# Patient Record
Sex: MALE | Race: WHITE | NOT HISPANIC OR LATINO | URBAN - METROPOLITAN AREA
[De-identification: names, ages, dates, MRNs, and addresses within clinical notes are randomized per-mention and may not be internally consistent; named-entity substitution may affect disease eponyms.]

---

## 2018-06-25 ENCOUNTER — OFFICE VISIT (OUTPATIENT)
Dept: SURGERY | Facility: HOSPITAL | Age: 63
End: 2018-06-25
Payer: MEDICARE

## 2018-06-25 VITALS
RESPIRATION RATE: 16 BRPM | DIASTOLIC BLOOD PRESSURE: 78 MMHG | TEMPERATURE: 97.1 F | SYSTOLIC BLOOD PRESSURE: 120 MMHG | BODY MASS INDEX: 26.18 KG/M2 | HEIGHT: 71 IN | WEIGHT: 187 LBS | HEART RATE: 72 BPM

## 2018-06-25 DIAGNOSIS — D22.9 MULTIPLE PIGMENTED NEVI: ICD-10-CM

## 2018-06-25 DIAGNOSIS — M62.08 RECTUS DIASTASIS: ICD-10-CM

## 2018-06-25 DIAGNOSIS — K40.20 BILATERAL INGUINAL HERNIA WITHOUT OBSTRUCTION OR GANGRENE, RECURRENCE NOT SPECIFIED: ICD-10-CM

## 2018-06-25 DIAGNOSIS — R10.9 ABDOMINAL PAIN, UNSPECIFIED ABDOMINAL LOCATION: Primary | ICD-10-CM

## 2018-06-25 DIAGNOSIS — K42.9 UMBILICAL HERNIA WITHOUT OBSTRUCTION AND WITHOUT GANGRENE: ICD-10-CM

## 2018-06-25 PROCEDURE — 99204 OFFICE O/P NEW MOD 45 MIN: CPT | Performed by: SURGERY

## 2018-06-25 RX ORDER — CITALOPRAM 20 MG/1
20 TABLET ORAL DAILY
Refills: 6 | Status: ON HOLD | COMMUNITY
Start: 2018-04-23 | End: 2018-11-27 | Stop reason: ALTCHOICE

## 2018-06-25 RX ORDER — CLOPIDOGREL BISULFATE 75 MG/1
75 TABLET ORAL DAILY
COMMUNITY
End: 2018-11-27 | Stop reason: HOSPADM

## 2018-06-25 RX ORDER — TRAZODONE HYDROCHLORIDE 100 MG/1
50 TABLET ORAL
Refills: 6 | COMMUNITY
Start: 2018-05-20 | End: 2022-04-15

## 2018-06-25 RX ORDER — ALFUZOSIN HYDROCHLORIDE 10 MG/1
10 TABLET, EXTENDED RELEASE ORAL DAILY
COMMUNITY
Start: 2018-06-24

## 2018-06-25 NOTE — PROGRESS NOTES
Assessment/Plan:  Adan Hughes is a 70 y o  male who presents today, per referral by Dr Madisyn Kimble, in consultation regarding possible bilateral inguinal and umbilical hernias  Physical exam confirmed the presence of moderately sized reducible bilateral inguinal hernias and a small reducible umbilical hernia  His bilateral upper quadrant pain may be referred from his bilateral inguinal hernias  However, a CTAP will be done to rule out other intraabdominal causes of his bilateral upper quadrant pain  Discussed risks, benefits, and alternatives of laparoscopic bilateral inguinal hernia repair with mesh and open umbilical hernia repair along with pre- and post- operative protocol  Explained that lifting restrictions of nothing greater than 15 lbs will be in place for weeks 1-2 and these will progress to 25 lbs during weeks 3-4  Light cardio may be resumed in weeks 3-4  He would like to plan for hernia repair after windsurfing season  He will have a CTAP with contrast done then return to discuss the results and plan for surgery in Autumn  Otherwise, he knows to contact our office if any problems or concerns arise  Skin- Encouraged him to have an annual skin exam performed by his PCP for monitoring of his multiple pigmented nevi  Rectus diastasis- Explained that this is a separation of abdominal wall muscles which increases his risk for ventral hernia formation along the midline  There are no diagnoses linked to this encounter  Subjective:      Patient ID: Adan Hughes is a 70 y o  male who presents today, per referral by Dr Madisyn Kimble, in consultation regarding possible bilateral inguinal and umbilical hernias  He has been windsurfing for many years  A few years ago, he felt onset of bilateral upper quadrant pain associated with a popping sensation while lifting his sail   Since then he has occasional severe bilateral upper quadrant and groin pain which is relieved by reduction of abdominal lumps and rest  Denies any nausea, vomiting, fever, and chills  Normal bowel and bladder habits  He currently takes Plavix for blood thinning  He is up to date on his colonoscopy  Recently, he has established with a urologist after being noted to have an elevated PSA  No past history of abdominal surgery  The following portions of the patient's history were reviewed and updated as appropriate: allergies, current medications, past family history, past medical history, past social history, past surgical history and problem list     Review of Systems   Constitutional: Negative  HENT: Negative  Eyes: Negative  Respiratory: Negative  Cardiovascular: Negative  Gastrointestinal: Positive for abdominal pain  Negative for constipation, diarrhea, nausea and vomiting  Endocrine: Negative  Genitourinary: Negative  Musculoskeletal: Positive for myalgias  Negative for arthralgias, back pain, gait problem, joint swelling, neck pain and neck stiffness  Skin: Negative  Allergic/Immunologic: Negative  Neurological: Negative  Hematological: Negative  Psychiatric/Behavioral: Negative  All other systems reviewed and are negative  Objective:      /78 (BP Location: Left arm, Patient Position: Sitting, Cuff Size: Standard)   Pulse 72   Temp (!) 97 1 °F (36 2 °C) (Tympanic)   Resp 16   Ht 5' 11" (1 803 m)   Wt 84 8 kg (187 lb)   BMI 26 08 kg/m²          Physical Exam   Constitutional: He is oriented to person, place, and time  He appears well-developed and well-nourished  No distress  HENT:   Head: Normocephalic and atraumatic  Right Ear: External ear normal    Left Ear: External ear normal    Nose: Nose normal    Mouth/Throat: Oropharynx is clear and moist  No oropharyngeal exudate  Eyes: Conjunctivae and EOM are normal  Right eye exhibits no discharge  Left eye exhibits no discharge  No scleral icterus  Neck: Normal range of motion  Neck supple   No JVD present  No tracheal deviation present  No thyromegaly present  Cardiovascular: Normal rate, regular rhythm, normal heart sounds and intact distal pulses  Exam reveals no gallop and no friction rub  No murmur heard  Pulmonary/Chest: Effort normal and breath sounds normal  No stridor  No respiratory distress  He has no wheezes  He has no rales  He exhibits no tenderness  Abdominal: Soft  Bowel sounds are normal  He exhibits no distension and no mass  There is no tenderness  There is no rebound and no guarding  Moderately sized reducible bilateral inguinal hernias and a small reducible umbilical hernia  Small rectus diastasis  Musculoskeletal: Normal range of motion  He exhibits no edema, tenderness or deformity  Lymphadenopathy:     He has no cervical adenopathy  Neurological: He is alert and oriented to person, place, and time  No cranial nerve deficit  Coordination normal    Skin: Skin is dry  No rash noted  He is not diaphoretic  No erythema  No pallor  Pigmented nevi and small hemangioma of the back  Psychiatric: He has a normal mood and affect  His behavior is normal  Thought content normal    Nursing note and vitals reviewed  By signing my name below, Olimpia Rubalcava, attest that this documentation has been prepared under the direction and in the presence of Sunday Nicole MD  Electonically Signed: James Rosalie, [address] [place] [address] 6/25/2018      I, Sunday Nicole, personally performed the services described in this documenation  All medical record entries made by the scribe were at my direction and in my presence  I have reviewed the chart and discharge instructions (if applicable) and agree that the record reflects my personal performance and is accurate and complete  Sunday Nicole MD  6/25/2018

## 2018-06-25 NOTE — LETTER
June 27, 2018     Franklin Garcia DO  [address] [facility] [address]  [place] [id number]    Patient: Jaleesa Fontana   YOB: 1955   Date of Visit: 6/25/2018       Dear Dr Lc Mckee: Thank you for referring Francisca Hoffmann to me for evaluation  Below are my notes for this consultation  If you have questions, please do not hesitate to call me  I look forward to following your patient along with you           Sincerely,        Dior Knott MD        CC: No Recipients

## 2018-06-26 ENCOUNTER — APPOINTMENT (OUTPATIENT)
Dept: LAB | Facility: HOSPITAL | Age: 63
End: 2018-06-26
Attending: SURGERY
Payer: MEDICARE

## 2018-06-26 DIAGNOSIS — R10.9 ABDOMINAL PAIN, UNSPECIFIED ABDOMINAL LOCATION: ICD-10-CM

## 2018-06-26 LAB
ANION GAP SERPL CALCULATED.3IONS-SCNC: 7 MMOL/L (ref 4–13)
BUN SERPL-MCNC: 14 MG/DL (ref 5–25)
CALCIUM SERPL-MCNC: 8.6 MG/DL (ref 8.3–10.1)
CHLORIDE SERPL-SCNC: 107 MMOL/L (ref 100–108)
CO2 SERPL-SCNC: 26 MMOL/L (ref 21–32)
CREAT SERPL-MCNC: 0.93 MG/DL (ref 0.6–1.3)
GFR SERPL CREATININE-BSD FRML MDRD: 86 ML/MIN/1.73SQ M
GLUCOSE P FAST SERPL-MCNC: 100 MG/DL (ref 65–99)
POTASSIUM SERPL-SCNC: 3.8 MMOL/L (ref 3.5–5.3)
SODIUM SERPL-SCNC: 140 MMOL/L (ref 136–145)

## 2018-06-26 PROCEDURE — 36415 COLL VENOUS BLD VENIPUNCTURE: CPT

## 2018-06-26 PROCEDURE — 80048 BASIC METABOLIC PNL TOTAL CA: CPT

## 2018-06-28 ENCOUNTER — HOSPITAL ENCOUNTER (OUTPATIENT)
Dept: CT IMAGING | Facility: HOSPITAL | Age: 63
Discharge: HOME | End: 2018-06-28
Attending: SURGERY
Payer: MEDICARE

## 2018-06-28 DIAGNOSIS — R10.9 ABDOMINAL PAIN, UNSPECIFIED ABDOMINAL LOCATION: ICD-10-CM

## 2018-06-28 PROCEDURE — 74177 CT ABD & PELVIS W/CONTRAST: CPT

## 2018-06-28 RX ADMIN — IOHEXOL 100 ML: 350 INJECTION, SOLUTION INTRAVENOUS at 08:43

## 2018-11-05 ENCOUNTER — APPOINTMENT (OUTPATIENT)
Dept: LAB | Facility: HOSPITAL | Age: 63
End: 2018-11-05
Attending: SURGERY
Payer: MEDICARE

## 2018-11-05 ENCOUNTER — HOSPITAL ENCOUNTER (OUTPATIENT)
Dept: RADIOLOGY | Facility: HOSPITAL | Age: 63
Discharge: HOME | End: 2018-11-05
Attending: SURGERY
Payer: MEDICARE

## 2018-11-05 ENCOUNTER — OFFICE VISIT (OUTPATIENT)
Dept: SURGERY | Facility: HOSPITAL | Age: 63
End: 2018-11-05
Payer: MEDICARE

## 2018-11-05 ENCOUNTER — HOSPITAL ENCOUNTER (OUTPATIENT)
Dept: NON INVASIVE DIAGNOSTICS | Facility: HOSPITAL | Age: 63
Discharge: HOME | End: 2018-11-05
Attending: SURGERY
Payer: MEDICARE

## 2018-11-05 VITALS
DIASTOLIC BLOOD PRESSURE: 80 MMHG | TEMPERATURE: 98.2 F | HEART RATE: 60 BPM | SYSTOLIC BLOOD PRESSURE: 124 MMHG | HEIGHT: 71 IN | WEIGHT: 188 LBS | BODY MASS INDEX: 26.32 KG/M2

## 2018-11-05 DIAGNOSIS — K40.20 NON-RECURRENT BILATERAL INGUINAL HERNIA WITHOUT OBSTRUCTION OR GANGRENE: ICD-10-CM

## 2018-11-05 DIAGNOSIS — K40.20 NON-RECURRENT BILATERAL INGUINAL HERNIA WITHOUT OBSTRUCTION OR GANGRENE: Primary | ICD-10-CM

## 2018-11-05 DIAGNOSIS — K42.9 UMBILICAL HERNIA WITHOUT OBSTRUCTION AND WITHOUT GANGRENE: ICD-10-CM

## 2018-11-05 DIAGNOSIS — K40.20 BILATERAL INGUINAL HERNIA WITHOUT OBSTRUCTION OR GANGRENE, RECURRENCE NOT SPECIFIED: ICD-10-CM

## 2018-11-05 DIAGNOSIS — K42.9 UMBILICAL HERNIA WITHOUT OBSTRUCTION OR GANGRENE: ICD-10-CM

## 2018-11-05 LAB
ANION GAP SERPL CALCULATED.3IONS-SCNC: 8 MMOL/L (ref 4–13)
BASOPHILS # BLD AUTO: 0.04 THOUSANDS/ΜL (ref 0–0.1)
BASOPHILS NFR BLD AUTO: 1 % (ref 0–1)
BILIRUB UR QL STRIP: NEGATIVE
BUN SERPL-MCNC: 17 MG/DL (ref 5–25)
CALCIUM SERPL-MCNC: 9.3 MG/DL (ref 8.3–10.1)
CHLORIDE SERPL-SCNC: 105 MMOL/L (ref 100–108)
CLARITY UR: CLEAR
CO2 SERPL-SCNC: 29 MMOL/L (ref 21–32)
COLOR UR: YELLOW
CREAT SERPL-MCNC: 0.9 MG/DL (ref 0.6–1.3)
EOSINOPHIL # BLD AUTO: 0.35 THOUSAND/ΜL (ref 0–0.61)
EOSINOPHIL NFR BLD AUTO: 6 % (ref 0–6)
ERYTHROCYTE [DISTWIDTH] IN BLOOD BY AUTOMATED COUNT: 12.8 % (ref 11.6–15.1)
EST. AVERAGE GLUCOSE BLD GHB EST-MCNC: 120 MG/DL
GFR SERPL CREATININE-BSD FRML MDRD: 89 ML/MIN/1.73SQ M
GLUCOSE SERPL-MCNC: 105 MG/DL (ref 65–140)
GLUCOSE UR STRIP-MCNC: NEGATIVE MG/DL
HBA1C MFR BLD: 5.8 % (ref 4.2–6.3)
HCT VFR BLD AUTO: 44 % (ref 36.5–49.3)
HGB BLD-MCNC: 14.4 G/DL (ref 12–17)
HGB UR QL STRIP.AUTO: NEGATIVE
IMM GRANULOCYTES # BLD AUTO: 0.03 THOUSAND/UL (ref 0–0.2)
IMM GRANULOCYTES NFR BLD AUTO: 1 % (ref 0–2)
KETONES UR STRIP-MCNC: NEGATIVE MG/DL
LEUKOCYTE ESTERASE UR QL STRIP: NEGATIVE
LYMPHOCYTES # BLD AUTO: 1.62 THOUSANDS/ΜL (ref 0.6–4.47)
LYMPHOCYTES NFR BLD AUTO: 28 % (ref 14–44)
MCH RBC QN AUTO: 31.2 PG (ref 26.8–34.3)
MCHC RBC AUTO-ENTMCNC: 32.7 G/DL (ref 31.4–37.4)
MCV RBC AUTO: 95 FL (ref 82–98)
MONOCYTES # BLD AUTO: 0.61 THOUSAND/ΜL (ref 0.17–1.22)
MONOCYTES NFR BLD AUTO: 11 % (ref 4–12)
NEUTROPHILS # BLD AUTO: 3.05 THOUSANDS/ΜL (ref 1.85–7.62)
NEUTS SEG NFR BLD AUTO: 53 % (ref 43–75)
NITRITE UR QL STRIP: NEGATIVE
NRBC BLD AUTO-RTO: 0 /100 WBCS
PH UR STRIP.AUTO: 7 [PH] (ref 4.5–8)
PLATELET # BLD AUTO: 212 THOUSANDS/UL (ref 149–390)
PMV BLD AUTO: 10.1 FL (ref 8.9–12.7)
POTASSIUM SERPL-SCNC: 3.8 MMOL/L (ref 3.5–5.3)
PROT UR STRIP-MCNC: NEGATIVE MG/DL
RBC # BLD AUTO: 4.61 MILLION/UL (ref 3.88–5.62)
SODIUM SERPL-SCNC: 142 MMOL/L (ref 136–145)
SP GR UR STRIP.AUTO: 1.02 (ref 1–1.03)
UROBILINOGEN UR QL STRIP.AUTO: 0.2 E.U./DL
WBC # BLD AUTO: 5.7 THOUSAND/UL (ref 4.31–10.16)

## 2018-11-05 PROCEDURE — 1124F ACP DISCUSS-NO DSCNMKR DOCD: CPT | Performed by: PHYSICIAN ASSISTANT

## 2018-11-05 PROCEDURE — 36415 COLL VENOUS BLD VENIPUNCTURE: CPT

## 2018-11-05 PROCEDURE — 80048 BASIC METABOLIC PNL TOTAL CA: CPT

## 2018-11-05 PROCEDURE — 83036 HEMOGLOBIN GLYCOSYLATED A1C: CPT

## 2018-11-05 PROCEDURE — 71046 X-RAY EXAM CHEST 2 VIEWS: CPT

## 2018-11-05 PROCEDURE — 99213 OFFICE O/P EST LOW 20 MIN: CPT | Performed by: SURGERY

## 2018-11-05 PROCEDURE — 81003 URINALYSIS AUTO W/O SCOPE: CPT | Performed by: SURGERY

## 2018-11-05 PROCEDURE — 93005 ELECTROCARDIOGRAM TRACING: CPT

## 2018-11-05 PROCEDURE — 85025 COMPLETE CBC W/AUTO DIFF WBC: CPT

## 2018-11-05 RX ORDER — ASPIRIN 81 MG/1
81 TABLET ORAL DAILY
COMMUNITY
End: 2018-11-27 | Stop reason: HOSPADM

## 2018-11-05 NOTE — PROGRESS NOTES
Assessment/Plan: Danilo Cade is a 70year old male who presents today for follow-up regarding bilateral inguinal hernias and an umbilical hernia  Physical exam revealed a small umbilical hernia and bilateral inguinal hernias  The left inguinal hernia is larger than the right side  Discussed risks, benefits, and alternatives to laparoscopic repair of hernias  Explained the surgery as well as pre- and post-procedural protocols and restrictions  Lifting restrictions of no greater than 15 pounds and no strenuous activity for 2 weeks after surgery  No heavy lifting of greater than 25 pounds for weeks 3 and 4  He should not drive until he is off narcotics  He will need to hold his Plavix and Aspirin for a week prior to surgery  Will coordinate clearance with his cardiologist  The office will schedule him for the procedure  He knows to contact the office if any questions or concerns arise  No problem-specific Assessment & Plan notes found for this encounter  There are no diagnoses linked to this encounter  Subjective:      Patient ID: Fransico Downs is a 70 y o  male  Danilo Cade is a 70year old male who presents today for follow-up regarding bilateral inguinal hernias and an umbilical hernia  He says he has had occasional episodes of pain in his abdomen when he is bending down  He is currently taking Plavix and has spoken with his cardiologist about having the procedure  The following portions of the patient's history were reviewed and updated as appropriate: allergies, current medications, past family history, past medical history, past social history, past surgical history and problem list     Review of Systems   Constitutional: Negative  HENT: Negative  Eyes: Negative  Respiratory: Negative  Cardiovascular: Negative  Gastrointestinal: Positive for abdominal pain  Endocrine: Negative  Genitourinary: Negative  Musculoskeletal: Negative  Skin: Negative  Allergic/Immunologic: Negative  Neurological: Negative  Hematological: Negative  Psychiatric/Behavioral: Negative  All other systems reviewed and are negative  Objective:      /80   Pulse 60   Temp 98 2 °F (36 8 °C) (Tympanic)   Ht 5' 11" (1 803 m)   Wt 85 3 kg (188 lb)   BMI 26 22 kg/m²          Physical Exam   Constitutional: He is oriented to person, place, and time  He appears well-developed and well-nourished  No distress  HENT:   Head: Normocephalic and atraumatic  Right Ear: External ear normal    Left Ear: External ear normal    Nose: Nose normal    Mouth/Throat: Oropharynx is clear and moist  No oropharyngeal exudate  Eyes: Conjunctivae and EOM are normal  Right eye exhibits no discharge  Left eye exhibits no discharge  No scleral icterus  Neck: Normal range of motion  Neck supple  No JVD present  No tracheal deviation present  No thyromegaly present  Cardiovascular: Normal rate, regular rhythm, normal heart sounds and intact distal pulses  Exam reveals no gallop and no friction rub  No murmur heard  Pulmonary/Chest: Effort normal and breath sounds normal  No stridor  No respiratory distress  He has no wheezes  He has no rales  He exhibits no tenderness  Abdominal: Soft  Bowel sounds are normal  He exhibits no distension and no mass  There is no tenderness  There is no rebound and no guarding  Small umbilical hernia and bilateral inguinal hernias  The left inguinal hernia is larger than the right side  Musculoskeletal: Normal range of motion  He exhibits no edema, tenderness or deformity  Lymphadenopathy:     He has no cervical adenopathy  Neurological: He is alert and oriented to person, place, and time  No cranial nerve deficit  Coordination normal    Skin: Skin is dry  No rash noted  He is not diaphoretic  No erythema  No pallor  Psychiatric: He has a normal mood and affect   His behavior is normal  Thought content normal    Nursing note and vitals reviewed  By signing my name below, I, Cruzito Tillman, attest that this documentation has been prepared under the direction and in the presence of Herminio Yung MD  Electronically Signed: Yeyo Rico  11/5/18  Lynnette Miranda, personally performed the services described in this documentation  All medical record entries made by the scribe were at my direction and in my presence  I have reviewed the chart and discharge instructions and agree that the record reflects my personal performance and is accurate and complete  Herminio Yung MD  11/5/18

## 2018-11-05 NOTE — LETTER
November 8, 2018     [place] [address]    Patient: Guzman Ellis   YOB: 1955   Date of Visit: 11/5/2018       Dear Dr Sara Recio: Thank you for referring Sesar Mondragon to me for evaluation  Below are my notes for this consultation  If you have questions, please do not hesitate to call me  I look forward to following your patient along with you  Sincerely,        Shiloh Duff MD        CC: No Recipients  Samantha Marshall  11/5/2018  9:29 AM  Sign at close encounter  Assessment/Plan: Sesar Mondragon is a 70year old male who presents today for follow-up regarding bilateral inguinal hernias and an umbilical hernia  No problem-specific Assessment & Plan notes found for this encounter  There are no diagnoses linked to this encounter  Subjective:      Patient ID: Guzman Ellis is a 70 y o  male  Sesar Mondragon is a 70year old male who presents today for follow-up regarding bilateral inguinal hernias and an umbilical hernia  The following portions of the patient's history were reviewed and updated as appropriate: allergies, current medications, past family history, past medical history, past social history, past surgical history and problem list     Review of Systems      Objective:      /80   Pulse 60   Temp 98 2 °F (36 8 °C) (Tympanic)   Ht 5' 11" (1 803 m)   Wt 85 3 kg (188 lb)   BMI 26 22 kg/m²           Physical Exam      By signing my name below, I, Samantha Marshall, attest that this documentation has been prepared under the direction and in the presence of Shiloh Duff MD  Electronically Signed: Dimitry Tan  11/5/18  Kirsten Cheney, personally performed the services described in this documentation  All medical record entries made by the dimitry were at my direction and in my presence   I have reviewed the chart and discharge instructions and agree that the record reflects my personal performance and is accurate and complete  Danae Dailey MD  11/5/18

## 2018-11-06 LAB
ATRIAL RATE: 51 BPM
P AXIS: 50 DEGREES
PR INTERVAL: 140 MS
QRS AXIS: 1 DEGREES
QRSD INTERVAL: 94 MS
QT INTERVAL: 450 MS
QTC INTERVAL: 414 MS
T WAVE AXIS: 33 DEGREES
VENTRICULAR RATE: 51 BPM

## 2018-11-06 PROCEDURE — 93010 ELECTROCARDIOGRAM REPORT: CPT | Performed by: INTERNAL MEDICINE

## 2018-11-08 RX ORDER — SODIUM CHLORIDE, SODIUM LACTATE, POTASSIUM CHLORIDE, CALCIUM CHLORIDE 600; 310; 30; 20 MG/100ML; MG/100ML; MG/100ML; MG/100ML
125 INJECTION, SOLUTION INTRAVENOUS CONTINUOUS
Status: CANCELLED | OUTPATIENT
Start: 2018-11-27

## 2018-11-14 RX ORDER — HYDROCHLOROTHIAZIDE 12.5 MG/1
12.5 TABLET ORAL DAILY
COMMUNITY

## 2018-11-14 RX ORDER — ENALAPRIL MALEATE 20 MG/1
20 TABLET ORAL DAILY
COMMUNITY

## 2018-11-14 NOTE — PRE-PROCEDURE INSTRUCTIONS
Pre-Surgery Instructions:   Medication Instructions    alfuzosin (UROXATRAL) 10 mg 24 hr tablet Instructed patient per Anesthesia Guidelines   aspirin (ECOTRIN LOW STRENGTH) 81 mg EC tablet Patient was instructed by Physician and understands   atorvastatin (LIPITOR) 40 mg tablet Instructed patient per Anesthesia Guidelines   citalopram (CeleXA) 20 mg tablet Instructed patient per Anesthesia Guidelines   clopidogrel (PLAVIX) 75 mg tablet Patient was instructed by Physician and understands   enalapril (VASOTEC) 20 mg tablet Instructed patient per Anesthesia Guidelines   escitalopram (LEXAPRO) 20 mg tablet Instructed patient per Anesthesia Guidelines   traZODone (DESYREL) 100 mg tablet Instructed patient per Anesthesia Guidelines   verapamil (CALAN) 120 mg tablet Instructed patient per Anesthesia Guidelines  Pre-op Showering Instructions for Surgery Patients    Before your operation, you play an important role in decreasing your risk for infection by washing with special antiseptic soap  This is an effective way to reduce bacteria on the skin which may help to prevent infections at the surgical site  Please read the following directions in advance  1  In the week before your operation, purchase a 4 ounce bottle of antiseptic soap containing chlorhexidine gluconate (CHG)  4%  Some brand names include: Aplicare®, Endure, and Hibiclens®  The cost is usually less than $5 00   For your convenience, the [employer] carries the soap   It may also be available at your doctors office or pre-admission testing center, and at most retail pharmacies   If you are allergic or sensitive to soaps containing CHG, please let your doctor know so another antiseptic can be suggested   CHG antiseptic soap is for external use only  2   The day before your operation, follow these instructions carefully to get ready   Please clean linens (sheets) on your bed; you should sleep on clean sheets after your evening shower   Get clean towels and washcloth ready - you need enough for 2 showers   Set aside clean underwear, pajamas, and clothing to wear after the showers     Reminders:   DO NOT use any other soap or body rinse on your skin during or after the antiseptic showers   DO NOT use lotion, powder, deodorant, or perfume/aftershave of any kind on your skin after your antiseptic shower   DO NOT shave any body parts in the 24 hours/day before your operation   DO NOT get the antiseptic soap in your eyes, ears, nose, mouth, or vaginal area    3  You will need to shower the night before AND the morning of your surgery  Shower 1:   The first evening before the operation, take the first shower   First, shampoo your hair with regular shampoo and rinse it completely before you use the antiseptic soap  After washing and rinsing your hair, rinse your body   Next, use a clean washcloth to apply the antiseptic soap and wash your body from the neck down to your toes using ½ bottle of the antiseptic soap   You will use the other ½ bottle for the second shower   Clean the area where your incision will be; lather this area well for about 2 minutes   If you are having head or neck surgery, wash areas with the antiseptic soap   Rinse yourself completely with running water   Use a clean towel to dry off   Wear clean underwear and clothing/pajamas  Shower 2   The morning of your operation, take the second shower following the same steps as Shower 1 using the second ½ of the bottle of antiseptic soap   Use clean cloths and towels to wash and dry yourself   Wear clean underwear and clothing

## 2018-11-27 ENCOUNTER — ANESTHESIA (OUTPATIENT)
Dept: PERIOP | Facility: HOSPITAL | Age: 63
End: 2018-11-27
Payer: MEDICARE

## 2018-11-27 ENCOUNTER — HOSPITAL ENCOUNTER (OUTPATIENT)
Facility: HOSPITAL | Age: 63
Setting detail: OUTPATIENT SURGERY
Discharge: HOME | End: 2018-11-27
Attending: SURGERY | Admitting: SURGERY
Payer: MEDICARE

## 2018-11-27 ENCOUNTER — ANESTHESIA EVENT (OUTPATIENT)
Dept: PERIOP | Facility: HOSPITAL | Age: 63
End: 2018-11-27
Payer: MEDICARE

## 2018-11-27 VITALS
BODY MASS INDEX: 26.07 KG/M2 | SYSTOLIC BLOOD PRESSURE: 154 MMHG | HEIGHT: 71 IN | WEIGHT: 186.2 LBS | RESPIRATION RATE: 18 BRPM | HEART RATE: 84 BPM | OXYGEN SATURATION: 95 % | TEMPERATURE: 97.5 F | DIASTOLIC BLOOD PRESSURE: 80 MMHG

## 2018-11-27 DIAGNOSIS — K40.20 NON-RECURRENT BILATERAL INGUINAL HERNIA WITHOUT OBSTRUCTION OR GANGRENE: Primary | ICD-10-CM

## 2018-11-27 DIAGNOSIS — R33.9 URINARY RETENTION: ICD-10-CM

## 2018-11-27 DIAGNOSIS — K42.9 UMBILICAL HERNIA WITHOUT OBSTRUCTION OR GANGRENE: ICD-10-CM

## 2018-11-27 PROCEDURE — C1781 MESH (IMPLANTABLE): HCPCS | Performed by: SURGERY

## 2018-11-27 PROCEDURE — 51798 US URINE CAPACITY MEASURE: CPT | Performed by: SURGERY

## 2018-11-27 PROCEDURE — 49650 LAP ING HERNIA REPAIR INIT: CPT | Performed by: PHYSICIAN ASSISTANT

## 2018-11-27 DEVICE — LAPAROSCOPIC SELF-FIXATING MESH, LEFT AND RIGHT ANATOMICAL
Type: IMPLANTABLE DEVICE | Site: INGUINAL | Status: FUNCTIONAL
Brand: PROGRIP

## 2018-11-27 RX ORDER — ACETAMINOPHEN 325 MG/1
650 TABLET ORAL EVERY 6 HOURS PRN
Status: DISCONTINUED | OUTPATIENT
Start: 2018-11-27 | End: 2018-11-27 | Stop reason: HOSPADM

## 2018-11-27 RX ORDER — EPHEDRINE SULFATE 50 MG/ML
INJECTION, SOLUTION INTRAVENOUS AS NEEDED
Status: DISCONTINUED | OUTPATIENT
Start: 2018-11-27 | End: 2018-11-27 | Stop reason: SURG

## 2018-11-27 RX ORDER — ONDANSETRON 2 MG/ML
INJECTION INTRAMUSCULAR; INTRAVENOUS AS NEEDED
Status: DISCONTINUED | OUTPATIENT
Start: 2018-11-27 | End: 2018-11-27 | Stop reason: SURG

## 2018-11-27 RX ORDER — CEFAZOLIN SODIUM 2 G/50ML
2000 SOLUTION INTRAVENOUS ONCE
Status: COMPLETED | OUTPATIENT
Start: 2018-11-27 | End: 2018-11-27

## 2018-11-27 RX ORDER — HYDROMORPHONE HCL/PF 1 MG/ML
0.5 SYRINGE (ML) INJECTION
Status: DISCONTINUED | OUTPATIENT
Start: 2018-11-27 | End: 2018-11-27 | Stop reason: HOSPADM

## 2018-11-27 RX ORDER — ONDANSETRON 2 MG/ML
4 INJECTION INTRAMUSCULAR; INTRAVENOUS EVERY 6 HOURS PRN
Status: DISCONTINUED | OUTPATIENT
Start: 2018-11-27 | End: 2018-11-27 | Stop reason: HOSPADM

## 2018-11-27 RX ORDER — PROPOFOL 10 MG/ML
INJECTION, EMULSION INTRAVENOUS AS NEEDED
Status: DISCONTINUED | OUTPATIENT
Start: 2018-11-27 | End: 2018-11-27 | Stop reason: SURG

## 2018-11-27 RX ORDER — ROCURONIUM BROMIDE 10 MG/ML
INJECTION, SOLUTION INTRAVENOUS AS NEEDED
Status: DISCONTINUED | OUTPATIENT
Start: 2018-11-27 | End: 2018-11-27 | Stop reason: SURG

## 2018-11-27 RX ORDER — FENTANYL CITRATE 50 UG/ML
INJECTION, SOLUTION INTRAMUSCULAR; INTRAVENOUS AS NEEDED
Status: DISCONTINUED | OUTPATIENT
Start: 2018-11-27 | End: 2018-11-27 | Stop reason: SURG

## 2018-11-27 RX ORDER — GLYCOPYRROLATE 0.2 MG/ML
INJECTION INTRAMUSCULAR; INTRAVENOUS AS NEEDED
Status: DISCONTINUED | OUTPATIENT
Start: 2018-11-27 | End: 2018-11-27 | Stop reason: SURG

## 2018-11-27 RX ORDER — FENTANYL CITRATE/PF 50 MCG/ML
25 SYRINGE (ML) INJECTION
Status: DISCONTINUED | OUTPATIENT
Start: 2018-11-27 | End: 2018-11-27 | Stop reason: HOSPADM

## 2018-11-27 RX ORDER — SODIUM CHLORIDE, SODIUM LACTATE, POTASSIUM CHLORIDE, CALCIUM CHLORIDE 600; 310; 30; 20 MG/100ML; MG/100ML; MG/100ML; MG/100ML
125 INJECTION, SOLUTION INTRAVENOUS CONTINUOUS
Status: DISCONTINUED | OUTPATIENT
Start: 2018-11-27 | End: 2018-11-27 | Stop reason: HOSPADM

## 2018-11-27 RX ORDER — ONDANSETRON 2 MG/ML
4 INJECTION INTRAMUSCULAR; INTRAVENOUS ONCE AS NEEDED
Status: DISCONTINUED | OUTPATIENT
Start: 2018-11-27 | End: 2018-11-27 | Stop reason: HOSPADM

## 2018-11-27 RX ORDER — HYDROCODONE BITARTRATE AND ACETAMINOPHEN 5; 325 MG/1; MG/1
1 TABLET ORAL EVERY 4 HOURS PRN
Qty: 18 TABLET | Refills: 0 | Status: SHIPPED | OUTPATIENT
Start: 2018-11-27 | End: 2018-12-07

## 2018-11-27 RX ORDER — MAGNESIUM HYDROXIDE 1200 MG/15ML
LIQUID ORAL AS NEEDED
Status: DISCONTINUED | OUTPATIENT
Start: 2018-11-27 | End: 2018-11-27 | Stop reason: HOSPADM

## 2018-11-27 RX ORDER — HYDROCODONE BITARTRATE AND ACETAMINOPHEN 5; 325 MG/1; MG/1
2 TABLET ORAL EVERY 4 HOURS PRN
Status: DISCONTINUED | OUTPATIENT
Start: 2018-11-27 | End: 2018-11-27 | Stop reason: HOSPADM

## 2018-11-27 RX ORDER — MIDAZOLAM HYDROCHLORIDE 1 MG/ML
INJECTION INTRAMUSCULAR; INTRAVENOUS AS NEEDED
Status: DISCONTINUED | OUTPATIENT
Start: 2018-11-27 | End: 2018-11-27 | Stop reason: SURG

## 2018-11-27 RX ORDER — KETOROLAC TROMETHAMINE 30 MG/ML
INJECTION, SOLUTION INTRAMUSCULAR; INTRAVENOUS AS NEEDED
Status: DISCONTINUED | OUTPATIENT
Start: 2018-11-27 | End: 2018-11-27 | Stop reason: SURG

## 2018-11-27 RX ADMIN — ROCURONIUM BROMIDE 50 MG: 10 SOLUTION INTRAVENOUS at 08:30

## 2018-11-27 RX ADMIN — FENTANYL CITRATE 50 MCG: 50 INJECTION, SOLUTION INTRAMUSCULAR; INTRAVENOUS at 08:47

## 2018-11-27 RX ADMIN — FENTANYL CITRATE 100 MCG: 50 INJECTION, SOLUTION INTRAMUSCULAR; INTRAVENOUS at 08:30

## 2018-11-27 RX ADMIN — EPHEDRINE SULFATE 10 MG: 50 INJECTION, SOLUTION INTRAMUSCULAR; INTRAVENOUS; SUBCUTANEOUS at 10:14

## 2018-11-27 RX ADMIN — ROCURONIUM BROMIDE 10 MG: 10 SOLUTION INTRAVENOUS at 09:26

## 2018-11-27 RX ADMIN — HYDROCODONE BITARTRATE AND ACETAMINOPHEN 2 TABLET: 5; 325 TABLET ORAL at 16:15

## 2018-11-27 RX ADMIN — CEFAZOLIN SODIUM 2000 MG: 2 SOLUTION INTRAVENOUS at 08:36

## 2018-11-27 RX ADMIN — SODIUM CHLORIDE, SODIUM LACTATE, POTASSIUM CHLORIDE, AND CALCIUM CHLORIDE 125 ML/HR: .6; .31; .03; .02 INJECTION, SOLUTION INTRAVENOUS at 14:59

## 2018-11-27 RX ADMIN — PROPOFOL 200 MG: 10 INJECTION, EMULSION INTRAVENOUS at 08:30

## 2018-11-27 RX ADMIN — EPHEDRINE SULFATE 5 MG: 50 INJECTION, SOLUTION INTRAMUSCULAR; INTRAVENOUS; SUBCUTANEOUS at 09:50

## 2018-11-27 RX ADMIN — GLYCOPYRROLATE 0.6 MG: 0.2 INJECTION, SOLUTION INTRAMUSCULAR; INTRAVENOUS at 10:27

## 2018-11-27 RX ADMIN — SODIUM CHLORIDE, SODIUM LACTATE, POTASSIUM CHLORIDE, AND CALCIUM CHLORIDE 125 ML/HR: .6; .31; .03; .02 INJECTION, SOLUTION INTRAVENOUS at 08:04

## 2018-11-27 RX ADMIN — FENTANYL CITRATE 25 MCG: 50 INJECTION, SOLUTION INTRAMUSCULAR; INTRAVENOUS at 10:41

## 2018-11-27 RX ADMIN — KETOROLAC TROMETHAMINE 15 MG: 30 INJECTION, SOLUTION INTRAMUSCULAR at 10:10

## 2018-11-27 RX ADMIN — SODIUM CHLORIDE, SODIUM LACTATE, POTASSIUM CHLORIDE, AND CALCIUM CHLORIDE 125 ML/HR: .6; .31; .03; .02 INJECTION, SOLUTION INTRAVENOUS at 15:00

## 2018-11-27 RX ADMIN — ONDANSETRON 4 MG: 2 INJECTION INTRAMUSCULAR; INTRAVENOUS at 10:12

## 2018-11-27 RX ADMIN — NEOSTIGMINE METHYLSULFATE 3 MG: 1 INJECTION, SOLUTION INTRAMUSCULAR; INTRAVENOUS; SUBCUTANEOUS at 10:27

## 2018-11-27 RX ADMIN — ROCURONIUM BROMIDE 10 MG: 10 SOLUTION INTRAVENOUS at 09:40

## 2018-11-27 RX ADMIN — SODIUM CHLORIDE, SODIUM LACTATE, POTASSIUM CHLORIDE, AND CALCIUM CHLORIDE: .6; .31; .03; .02 INJECTION, SOLUTION INTRAVENOUS at 09:57

## 2018-11-27 RX ADMIN — MIDAZOLAM HYDROCHLORIDE 2 MG: 1 INJECTION, SOLUTION INTRAMUSCULAR; INTRAVENOUS at 08:27

## 2018-11-27 RX ADMIN — FENTANYL CITRATE 50 MCG: 50 INJECTION, SOLUTION INTRAMUSCULAR; INTRAVENOUS at 08:59

## 2018-11-27 RX ADMIN — HYDROCODONE BITARTRATE AND ACETAMINOPHEN 2 TABLET: 5; 325 TABLET ORAL at 11:47

## 2018-11-27 NOTE — ANESTHESIA PREPROCEDURE EVALUATION
Review of Systems/Medical History  Patient summary reviewed        Cardiovascular  Hyperlipidemia, Hypertension , CAD , Cardiac stents > 1 year Dysrhythmias , atrial fibrillation,   Comment: Hx of ablation,  Pulmonary  Sleep apnea CPAP,        GI/Hepatic  Negative GI/hepatic ROS          Negative  ROS        Endo/Other  Negative endo/other ROS      GYN  Negative gynecology ROS          Hematology  Negative hematology ROS      Musculoskeletal  Negative musculoskeletal ROS        Neurology  Negative neurology ROS      Psychology   Negative psychology ROS              Physical Exam    Airway    Mallampati score: II  TM Distance: >3 FB  Neck ROM: full     Dental   No notable dental hx     Cardiovascular  Cardiovascular exam normal    Pulmonary  Pulmonary exam normal     Other Findings        Anesthesia Plan  ASA Score- 2     Anesthesia Type- general with ASA Monitors  Additional Monitors:   Airway Plan: ETT  Plan Factors-    Induction- intravenous  Postoperative Plan- Plan for postoperative opioid use  Informed Consent- Anesthetic plan and risks discussed with patient  I personally reviewed this patient with the CRNA  Discussed and agreed on the Anesthesia Plan with the CRNA  Renae Man

## 2018-11-27 NOTE — ANESTHESIA POSTPROCEDURE EVALUATION
Post-Op Assessment Note      CV Status:  Stable    Mental Status:  Alert and awake    Hydration Status:  Euvolemic    PONV Controlled:  Controlled    Airway Patency:  Patent    Post Op Vitals Reviewed: Yes          Staff: Anesthesiologist, CRNA       Comments: Awake on 3L NC to PACU          BP   172/89   Temp      Pulse  99   Resp   16   SpO2   99

## 2018-11-27 NOTE — PROGRESS NOTES
Hendrickson catheter placed  Patient given norco for abdominal discomfort and penis discomfort  Patient feels "relieved" and patient instructed to follow up with urologist   I called and let the office know what happened and left a message for the office RN to call patient back and let him know what to do after d/c  Hendrickson care, leg bag, and big bag teaching done with patient and wife at the bedside

## 2018-11-27 NOTE — PROGRESS NOTES
Patient does not have the urge to void at this time  Patient bladder scanned for 401ml  Dr Delia Willson aware  Per dr Carl Sebastian, patient to have 900ml of IV fluids, and void within 1hr 15 min  If pt does not void in this timeframe a nascimento catheter is to be placed and flomax to be ordered    Will await further instructions

## 2018-11-27 NOTE — H&P (VIEW-ONLY)
Assessment/Plan: Demetrio Rahman is a 70year old male who presents today for follow-up regarding bilateral inguinal hernias and an umbilical hernia  Physical exam revealed a small umbilical hernia and bilateral inguinal hernias  The left inguinal hernia is larger than the right side  Discussed risks, benefits, and alternatives to laparoscopic repair of hernias  Explained the surgery as well as pre- and post-procedural protocols and restrictions  Lifting restrictions of no greater than 15 pounds and no strenuous activity for 2 weeks after surgery  No heavy lifting of greater than 25 pounds for weeks 3 and 4  He should not drive until he is off narcotics  He will need to hold his Plavix and Aspirin for a week prior to surgery  Will coordinate clearance with his cardiologist  The office will schedule him for the procedure  He knows to contact the office if any questions or concerns arise  No problem-specific Assessment & Plan notes found for this encounter  There are no diagnoses linked to this encounter  Subjective:      Patient ID: Jaynee Crigler is a 70 y o  male  Demetrio Rahman is a 70year old male who presents today for follow-up regarding bilateral inguinal hernias and an umbilical hernia  He says he has had occasional episodes of pain in his abdomen when he is bending down  He is currently taking Plavix and has spoken with his cardiologist about having the procedure  The following portions of the patient's history were reviewed and updated as appropriate: allergies, current medications, past family history, past medical history, past social history, past surgical history and problem list     Review of Systems   Constitutional: Negative  HENT: Negative  Eyes: Negative  Respiratory: Negative  Cardiovascular: Negative  Gastrointestinal: Positive for abdominal pain  Endocrine: Negative  Genitourinary: Negative  Musculoskeletal: Negative  Skin: Negative  Allergic/Immunologic: Negative  Neurological: Negative  Hematological: Negative  Psychiatric/Behavioral: Negative  All other systems reviewed and are negative  Objective:      /80   Pulse 60   Temp 98 2 °F (36 8 °C) (Tympanic)   Ht 5' 11" (1 803 m)   Wt 85 3 kg (188 lb)   BMI 26 22 kg/m²          Physical Exam   Constitutional: He is oriented to person, place, and time  He appears well-developed and well-nourished  No distress  HENT:   Head: Normocephalic and atraumatic  Right Ear: External ear normal    Left Ear: External ear normal    Nose: Nose normal    Mouth/Throat: Oropharynx is clear and moist  No oropharyngeal exudate  Eyes: Conjunctivae and EOM are normal  Right eye exhibits no discharge  Left eye exhibits no discharge  No scleral icterus  Neck: Normal range of motion  Neck supple  No JVD present  No tracheal deviation present  No thyromegaly present  Cardiovascular: Normal rate, regular rhythm, normal heart sounds and intact distal pulses  Exam reveals no gallop and no friction rub  No murmur heard  Pulmonary/Chest: Effort normal and breath sounds normal  No stridor  No respiratory distress  He has no wheezes  He has no rales  He exhibits no tenderness  Abdominal: Soft  Bowel sounds are normal  He exhibits no distension and no mass  There is no tenderness  There is no rebound and no guarding  Small umbilical hernia and bilateral inguinal hernias  The left inguinal hernia is larger than the right side  Musculoskeletal: Normal range of motion  He exhibits no edema, tenderness or deformity  Lymphadenopathy:     He has no cervical adenopathy  Neurological: He is alert and oriented to person, place, and time  No cranial nerve deficit  Coordination normal    Skin: Skin is dry  No rash noted  He is not diaphoretic  No erythema  No pallor  Psychiatric: He has a normal mood and affect   His behavior is normal  Thought content normal    Nursing note and vitals reviewed  By signing my name below, I, Mary Grace Del Castillo, attest that this documentation has been prepared under the direction and in the presence of Chel Manzano MD  Electronically Signed: Yeyo Merchant  11/5/18  Mirta Potts, personally performed the services described in this documentation  All medical record entries made by the scribe were at my direction and in my presence  I have reviewed the chart and discharge instructions and agree that the record reflects my personal performance and is accurate and complete  Chel Manzano MD  11/5/18

## 2018-11-27 NOTE — INTERIM OP NOTE
REPAIR BILATERAL HERNIA INGUINAL, LAPAROSCOPIC, REPAIR HERNIA UMBILICAL OPEN  Postoperative Note  PATIENT NAME: Chantal Bajwa  : 1955  MRN: [id number]  [facility] OR ROOM 02    Surgery Date: 2018    Preop Diagnosis:  Non-recurrent bilateral inguinal hernia without obstruction or gangrene [Y21 56]  Umbilical hernia without obstruction or gangrene [K42 9]    Post-Op Diagnosis Codes:     * Non-recurrent bilateral inguinal hernia without obstruction or gangrene [S86 07]     * Umbilical hernia without obstruction or gangrene [K42 9]    Procedure(s) (LRB):  REPAIR BILATERAL HERNIA INGUINAL, LAPAROSCOPIC (Bilateral)  REPAIR HERNIA UMBILICAL OPEN (N/A)    Surgeon(s) and Role:     * Cordelia Barrett MD - Primary     * Yojana Tucker PA-C - Assisting    Specimens:  * No specimens in log *    Estimated Blood Loss:   100 mL    Anesthesia Type:   General ETA  Findings:    as above  Complications:   None    Hernia Surgery Operative Note    Name: Chantal Bajwa    Gender: male    Age: 70 y o  Race:     BMI: Body mass index is 25 97 kg/m²      DIAGNOSIS:  Bilateral inguinal and umbilical hernias    Diabetes Mellitis: NO    Coronary Heart Disease: Yes    Cancer: No    Steroid Use: No    Tobacco use: No   Last used:    Type: cigarettes   Frequency (per day): unknown   Duration (years): unknown    Alcohol use: Yes Moderate    Location of Hernia:  Right indirect inguinal  Length:1 0 cm  Width:1 0 cm  Primary: Yes  Recurrent: No   Number of recurrences:    Access: Laparoscope    Component Separation: No    Mesh:   Yes -  Type: Synthetic   Progress Bright anatomical    Location of Hernia:  Left indirect inguinal  Length:1 0 cm  Width:1 0 cm  Primary: Yes  Recurrent: No   Number of recurrences:    Access: Laparoscope    Component Separation: No    Mesh:   Yes -  Type: Synthetic   Progressive left anatomical    Location of Hernia: umbilical  UMZZGL:7 5 cm  Width:1 0 cm  Primary: Yes  Recurrent: No   Number of recurrences:    Access: Open    Component Separation: No    Mesh:   No      Operative Time: 96 min            SIGNATURE: Mukesh Tucker PA-C   DATE: November 27, 2018   TIME: 10:29 AM

## 2018-11-27 NOTE — PROGRESS NOTES
Patient unable to urinate   Per LOLA TA, place nascimento catheter and have patient follow up with his urologist

## 2018-11-29 NOTE — DISCHARGE INSTRUCTIONS
Valente Hernandez Instructions      Dr Win BREWSTER     1  General: Gerson Gibbons will feel pulling sensations around the wound or funny aches and pains around the incisions  This is normal  Even minor surgery is a change in your body and this is your bodys way of reaction to it  If you have had abdominal surgery, it may help to support the incision with a small pillow or blanket for comfort when moving or coughing  2  Wound care:       Glue - Leave glue alone, it will fall off on its own, no need for an additional dressings    3  Water: You may shower over the wound, unless there are drain tubes left in place  Do not bathe or use a pool or hot tub until cleared by the physician  You may shower right over the staples, glue or Steri-Strips and rinse wound with soapy water but do not scrub incision pat dry when you are done  4  Activity: You may go up and down stairs, walk as much as you are comfortable, but walk at least 3 times each day  If you have had abdominal surgery, do not lift anything heavier than 15 pounds for at least 2 weeks, unless cleared by the doctor  5  Diet: You may resume a regular diet  If you had a same-day surgery or overnight stay surgery, you may wish to eat lightly for a few days: soups, crackers, and sandwiches  You may resume a regular diet when ready  6  Medications: Resume all of your previous medications, unless told otherwise by the doctor  Avoid aspirin or ibuprofen (Advil, Motrin, etc ) products for 2-3 days after the date of surgery  You may, at that time, began to take them again  Tylenol is always fine, unless you are taking any narcotic pain medication containing Tylenol (such as Percocet, Darvocet, Vicodin, or anything containing acetaminophen)  Do not take Tylenol if you're taking these medications  You do not need to take the narcotic pain medications unless you are having significant pain and discomfort      7  Driving: He will need someone to drive you home on the day of surgery  Do not drive or make any important decisions while on narcotic pain medication or 24 hours and after anesthesia or sedation for surgery  Generally, you may drive when your off all narcotic pain medications  8  Upset Stomach: You may take Maalox, Tums, or similar items for an upset stomach  If your narcotic pain medication causes an upset stomach, do not take it on an empty stomach  Try taking it with at least some crackers or toast      9  Constipation: Patients often experienced constipation after surgery  You may take over-the-counter medication for this, such as Metamucil, Senokot, Dulcolax, milk of magnesia, etc  You may take a suppository unless you have had anorectal surgery such as a procedure on your hemorrhoids  If you experience significant nausea or vomiting after abdominal surgery, call the office before trying any of these medications  10  Call the office: If you are experiencing any of the following, fevers above 101 5°, significant nausea or vomiting, if the wound develops drainage and/or is excessive redness around the wound, or if you have significant diarrhea or other worsening symptoms  11  Pain: You may be given a prescription for pain  This will be given to the hospital, the day of surgery  12  Sexual Activity: You may resume sexual activity when you feel ready and comfortable and your incision is sealed and healed without apparent infection risk  13  Urination: If you haven't urinated in 6 hours, go directly to the ER for evaluation for urinary retention  14  Follow-up in 2 weeks  [facility]  Phone: [phone]          Urinary Leg Bag   WHAT YOU NEED TO KNOW:   What is a urinary leg bag? A urinary leg bag holds urine that drains from your catheter  It fits under your clothes and allows you to do your normal daily activities  How do I use a urinary leg bag?    · Wash your hands  before and after you touch your catheter, tubing, or drainage bag  Use soap and water  This reduces the risk of infection  · Strap your leg bag to your thigh or calf  Make sure the straps are comfortable  The straps can cause problems with blood flow in your leg if they are too tight  · Clean the tip of the drainage tube with alcohol  before attaching it to your catheter  This helps prevent bacteria from getting into your catheter  · The connecting tube should not pull on your catheter  Skin breakdown can occur if there is constant pulling on the catheter  · Check the tube often to make sure it is not kinked or twisted  Blockage in the tube can cause urine to back up into your bladder  Your urine must flow straight through the tube into your leg bag  · Always keep the leg bag below your bladder  This prevents urine from the bag going back into your bladder, which may cause an infection  · Empty your leg bag when it is ½ full, or every 3 hours  A full bag may break or disconnect from the catheter  · Change to your bedside bag before you go to bed  Your bedside bag can hold more urine  Do not use your leg bag at night because it could become too full or break  · Clean your leg bag after every use  Fill the bag with 2 parts vinegar and 3 parts water  Let it soak for 20 minutes, then rinse and let dry  Follow your healthcare provider's instruction on replacing your leg bag with a new one  CARE AGREEMENT:   You have the right to help plan your care  Learn about your health condition and how it may be treated  Discuss treatment options with your caregivers to decide what care you want to receive  You always have the right to refuse treatment  The above information is an  only  It is not intended as medical advice for individual conditions or treatments  Talk to your doctor, nurse or pharmacist before following any medical regimen to see if it is safe and effective for you    © 2017 [employer] [address] [place] [address] is for End User's use only and may not be sold, redistributed or otherwise used for commercial purposes  All illustrations and images included in CareNotes® are the copyrighted property of [employer] M , Inc  or [employer] Clemente  Hendrickson Catheter Placement and Care   AMBULATORY CARE:   A Hendrickson catheter  is a sterile tube that is inserted into your bladder to drain urine  It is also called an indwelling urinary catheter  The tip of the catheter has a small balloon filled with solution that holds the catheter in your bladder  How a Hendrickson catheter is placed:   · Your healthcare provider will clean your genital area with a sterile solution  He or she will put lubricant jelly on the catheter to help it go in smoothly  The end of the catheter with the deflated balloon will be inserted into your urethra  The catheter will be moved slowly and gently into your bladder  You may be asked to take slow, deep breaths or to push as if you were trying to urinate as the catheter is inserted  · When your healthcare provider sees urine flowing from the catheter, he or she will fill the balloon at the end of the catheter  The balloon holds the catheter in place so it does not come out  Your healthcare provider will attach the open end of the catheter to a sterile drainage bag  Seek care immediately if:   · Your catheter comes out  · You suddenly have material that looks like sand in the tubing or drainage bag  · No urine is draining into the bag and you have checked the system  · You have pain in your hip, back, pelvis, or lower abdomen  · You are confused or cannot think clearly  Contact your healthcare provider if:   · You have a fever  · You have bladder spasms for more than 1 day after the catheter is placed  · You see blood in the tubing or drainage bag  · You have a rash or itching where the catheter tube is secured to your skin      · Urine leaks from or around the catheter, tubing, or drainage bag  · The closed drainage system has accidently come open or apart  · You see a layer of crystals inside the tubing  · You have questions or concerns about your condition or care  Care for your Hendrickson catheter:   · Clean your genital area 2 times every day  Clean your catheter and the area around where it was inserted  Use soap and water  Clean your anal opening and catheter area after every bowel movement  · Secure the catheter tube  so you do not pull or move the catheter  This helps prevent pain and bladder spasms  Healthcare providers will show you how to use medical tape or a strap to secure the catheter tube to your body  · Keep a closed drainage system  Your Hendrickson catheter should always be attached to the drainage bag to form a closed system  Do not disconnect any part of the closed system unless you need to change the bag  Care for your drainage bag:   · Ask if a leg bag is right for you  A leg bag can be worn under your clothes  Ask your healthcare provider for more information about a leg bag  · Keep the drainage bag below the level of your waist   This helps stop urine from moving back up the tubing and into your bladder  Do not loop or kink the tubing  This can cause urine to back up and collect in your bladder  Do not let the drainage bag touch or lie on the floor  · Empty the drainage bag when needed  The weight of a full drainage bag can be painful  Empty the drainage bag every 3 to 6 hours or when it is ? full  · Clean and change the drainage bag as directed  Ask your healthcare provider how often you should change the drainage bag and what cleaning solution to use  Wear disposable gloves when you change the bag  Do not allow the end of the catheter or tubing to touch anything  Clean the ends with an alcohol pad before you reconnect them    What to do if problems develop:   · No urine is draining into the bag:      ¨ Check for kinks in the tubing and straighten them out  ¨ Check the tape or strap used to secure the catheter tube to your skin  Make sure it is not blocking the tube  ¨ Make sure you are not sitting or lying on the tubing  ¨ Make sure the urine bag is hanging below the level of your waist     · Urine leaks from or around the catheter, tubing, or drainage bag:  Check if the closed drainage system has accidently come open or apart  Clean the catheter and tubing ends with a new alcohol pad and reconnect them  Prevent an infection:   · Wash your hands often  Wash before and after you touch your catheter, tubing, or drainage bag  Use soap and water  Wear clean disposable gloves when you care for your catheter or disconnect the drainage bag  Wash your hands before you prepare or eat food  · Drink liquids as directed  Ask your healthcare provider how much liquid to drink each day and which liquids are best for you  Liquids will help flush your kidneys and bladder to help prevent infection  Follow up with your healthcare provider as directed:  Write down your questions so you remember to ask them during your visits  © 2017 [address]  Information is for End User's use only and may not be sold, redistributed or otherwise used for commercial purposes  All illustrations and images included in CareNotes® are the copyrighted property of A D A M , Inc  or [employer] Clement[employer]  The above information is an  only  It is not intended as medical advice for individual conditions or treatments  Talk to your doctor, nurse or pharmacist before following any medical regimen to see if it is safe and effective for you

## 2018-11-30 ENCOUNTER — TELEPHONE (OUTPATIENT)
Dept: SURGERY | Facility: HOSPITAL | Age: 63
End: 2018-11-30

## 2018-11-30 NOTE — OP NOTE
Inguinal Hernia, Laparocopic, Procedure Note    Name: Ashlee Calabrese   : 1955  MRN: [id number]  Date: 2018    Indications: The patient presented with a history of a bilateral, reducible inguinal hernia and umbilical hernia    Pre-operative Diagnosis: bilateral reducible inguinal hernia and umbilical hernia    Post-operative Diagnosis: bilateral reducible direct and/or indirect inguinal hernia and umbilical hernia    Procedure: Laparoscopic repair of bilateral inguinal hernia with mesh, Laparoscopic assisted bilateral TAP block, open umbilical hernia repair (primary)    Surgeon: Andi Lundborg, MD  Assistants: Naun Tucker PA-C, no qualified resident available  PA was medically necessary for the surgical safety of the case, including suturing, retraction, hemostasis  Anesthesia: General endotracheal anesthesia    Procedure Details   The patient was seen again in the Holding Room  The risks, benefits, complications, treatment options, and expected outcomes were discussed with the patient  The possibilities of reaction to medication, pulmonary aspiration, perforation of viscus, bleeding, postoperative short or long term nerve entrapment causing pain,recurrent infection, the need for additional procedures, and development of a complication requiring transfusion or further operation were discussed with the patient and/or family  There was concurrence with the proposed plan, and informed consent was obtained  The site of surgery was properly noted/marked  The patient was taken to the Operating Room, identified as Ashlee Calabrese and the procedure verified as hernia repair  A Time Out was held and the above information confirmed  The patient was prepped and draped in a sterile fashion  A timeout was again performed  Local anesthesia was used in the incision   An umbilical incision was made and a Randye Brandt was passed around the umbilical stalk and this was  from underlying umbilical hernia sac using cautery  A mukesh trocar was inserted into the abdomen via the umbilical hernia defect and the abdomen was insufflated to appropriate pressure  Two additional 5mm trocars were placed lateral to rectus muscle approximately at the level of the umbilicus  At this point the patient was placed into Trendelenburg position and noted to have bilateral inguinal hernia   A laparoscopic assisted right TAP block was performed using a combination of lidocaine and marcaine  The peritoneum was incised from the medial umbilical fold out laterally past the internal ring on the right  Using peanut the superior flap was dissected  Next the direct space was mobilized by exposing Willie's ligament all the way along its length to the pubic tubercle  If a direct hernia defect was seen this was dissected and reduced  If there was indirect hernia sac this was carefully mobilized off the cord structures with care to avoid injury to the gonadal vessels or spermatic cord  The remainder of the inferior flap was created  At this point Progrip mesh was selected and placed into the preperitoneal space in an appropriate fashion  The peritoneum was closed using a running V lock suture  The peritoneum was incised from the medial umbilical fold out laterally past the internal ring on the left  Using peanut the superior flap was dissected  Next the direct space was mobilized by exposing Wlilie's ligament all the way along its length to the pubic tubercle  If a direct hernia defect was seen this was dissected and reduced  If there was indirect hernia sac this was carefully mobilized off the cord structures with care to avoid injury to the gonadal vessels or spermatic cord  The remainder of the inferior flap was created  At this point Progrip mesh was selected and placed into the preperitoneal space in an appropriate fashion  The peritoneum was closed using a running V lock suture         The umbilical hernia site was closed with multiple figure of eight 2-0 Prolene sutures  The wound was closed in multiple layers using 3-0 Vicryl sutures and the skin of all ports were closed using a 4-0 Monocryl subcuticular stitch  The wound was dressed with Histacryl  The patient was anatomically correct at the end of the procedure  The patient tolerated the procedure in good condition  Instrument, sponge, and needle counts were correct prior to closure and at the conclusion of the case  This text is generated with voice recognition software  There may be translation, syntax,  or grammatical errors  If you have any questions, please contact the dictating provider  Findings: bilateral reducible direct and/or indirect inguinal hernia and umbilical hernia    Estimated Blood Loss: Minimal       Specimens: All specimens were sent for pathology  * No orders in the log *         Complications: None; patient tolerated the procedure well             Disposition: PACU            Condition: stable    Signature:   Yariel Childs MD  Date: 11/30/2018 Time: 12:22 PM

## 2018-12-10 ENCOUNTER — OFFICE VISIT (OUTPATIENT)
Dept: SURGERY | Facility: HOSPITAL | Age: 63
End: 2018-12-10

## 2018-12-10 VITALS — TEMPERATURE: 98.7 F | BODY MASS INDEX: 26.63 KG/M2 | HEIGHT: 71 IN | WEIGHT: 190.2 LBS

## 2018-12-10 DIAGNOSIS — Z09 POSTOP CHECK: Primary | ICD-10-CM

## 2018-12-10 PROCEDURE — 99024 POSTOP FOLLOW-UP VISIT: CPT | Performed by: SURGERY

## 2018-12-10 NOTE — PROGRESS NOTES
Assessment/Plan: Nakul Mancini is a 70year old male who presents today in post-operative state status post laparoscopic repair of bilateral inguinal hernias and an umbilical hernia performed on 11/27/18  Physical exam revealed the incisions are healing well  Lifting restrictions of no greater than 25 pounds for 2 more weeks  He should wait 4-6 more weeks until core exercises or strenuous weight lifting  He may follow up as needed  He knows to contact the office if any questions or concerns arise  No problem-specific Assessment & Plan notes found for this encounter  There are no diagnoses linked to this encounter  Subjective:      Patient ID: Maximiliano Gamino is a 70 y o  male  Nakul Mancini is a 70year old male who presents today in post-operative state status post laparoscopic repair of bilateral inguinal hernias and an umbilical hernia performed on 11/27/18  He reports he is doing well, but has had some bruising that is improving  The following portions of the patient's history were reviewed and updated as appropriate: allergies, current medications, past family history, past medical history, past social history, past surgical history and problem list     Review of Systems      Objective:      Temp 98 7 °F (37 1 °C)   Ht 5' 11" (1 803 m)   Wt 86 3 kg (190 lb 3 2 oz)   BMI 26 53 kg/m²          Physical Exam   Abdominal:   Incisions are healing well  By signing my name below, I, Katharine Key, attest that this documentation has been prepared under the direction and in the presence of Ewelina Galan MD  Electronically Signed: Yeyo Herrera  12/10/18  Maria G Orellana, personally performed the services described in this documentation  All medical record entries made by the scribe were at my direction and in my presence   I have reviewed the chart and discharge instructions and agree that the record reflects my personal performance and is accurate and complete  Aleksandra Ch MD  12/10/18

## 2018-12-10 NOTE — LETTER
December 12, 2018     Shivam LinaresDO  [address] [facility]  [address] [place] [address]    Patient: Racheal Fernandez   YOB: 1955   Date of Visit: 12/10/2018       Dear Dr Andrea Eaton: Thank you for referring Marsha Palm to me for evaluation  Below are my notes for this consultation  If you have questions, please do not hesitate to call me  I look forward to following your patient along with you  Sincerely,        Melany Ingram MD        CC: No Recipients  Vianca Fraser  12/10/2018  8:48 AM  Sign at close encounter  Assessment/Plan: Marsha Palm is a 70year old male who presents today in post-operative state status post laparoscopic repair of bilateral inguinal hernias and an umbilical hernia performed on 11/27/18  Physical exam revealed the incisions are healing well  Lifting restrictions of no greater than 25 pounds for 2 more weeks  He should wait 4-6 more weeks until core exercises  He may follow up as needed  He knows to contact the office if any questions or concerns arise  No problem-specific Assessment & Plan notes found for this encounter  There are no diagnoses linked to this encounter  Subjective:      Patient ID: Racheal Fernandez is a 70 y o  male  Marsha Palm is a 70year old male who presents today in post-operative state status post laparoscopic repair of bilateral inguinal hernias and an umbilical hernia performed on 11/27/18  He reports he is doing well, but has had some bruising          The following portions of the patient's history were reviewed and updated as appropriate: allergies, current medications, past family history, past medical history, past social history, past surgical history and problem list     Review of Systems      Objective:      Temp 98 7 °F (37 1 °C)   Ht 5' 11" (1 803 m)   Wt 86 3 kg (190 lb 3 2 oz)   BMI 26 53 kg/m²           Physical Exam      By signing my name below, I, Vianca Fraser, attest that this documentation has been prepared under the direction and in the presence of Melany Ingram MD  Electronically Signed: Yeyo Bush  12/10/18  Cristiana Sanchez, personally performed the services described in this documentation  All medical record entries made by the scribe were at my direction and in my presence  I have reviewed the chart and discharge instructions and agree that the record reflects my personal performance and is accurate and complete  Melany Ingram MD  12/10/18

## 2019-09-11 ENCOUNTER — TRANSCRIBE ORDERS (OUTPATIENT)
Dept: ADMINISTRATIVE | Facility: HOSPITAL | Age: 64
End: 2019-09-11

## 2019-09-11 DIAGNOSIS — M54.17 LUMBOSACRAL NEURITIS: Primary | ICD-10-CM

## 2022-03-08 ENCOUNTER — OFFICE VISIT (OUTPATIENT)
Dept: SLEEP CENTER | Facility: CLINIC | Age: 67
End: 2022-03-08
Payer: MEDICARE

## 2022-03-08 VITALS
DIASTOLIC BLOOD PRESSURE: 60 MMHG | WEIGHT: 162 LBS | BODY MASS INDEX: 22.68 KG/M2 | SYSTOLIC BLOOD PRESSURE: 132 MMHG | HEIGHT: 71 IN

## 2022-03-08 DIAGNOSIS — F51.01 PRIMARY INSOMNIA: ICD-10-CM

## 2022-03-08 DIAGNOSIS — Z78.9 DAILY CONSUMPTION OF ALCOHOL: ICD-10-CM

## 2022-03-08 DIAGNOSIS — G47.33 OSA (OBSTRUCTIVE SLEEP APNEA): Primary | ICD-10-CM

## 2022-03-08 DIAGNOSIS — I48.91 ATRIAL FIBRILLATION STATUS POST CARDIOVERSION (HCC): ICD-10-CM

## 2022-03-08 DIAGNOSIS — G47.30 SLEEP APNEA, UNSPECIFIED: ICD-10-CM

## 2022-03-08 PROBLEM — M51.369 DEGENERATIVE DISC DISEASE, LUMBAR: Status: ACTIVE | Noted: 2020-01-31

## 2022-03-08 PROBLEM — M43.16 SPONDYLOLISTHESIS OF LUMBAR REGION: Status: ACTIVE | Noted: 2020-01-31

## 2022-03-08 PROBLEM — U07.1 COVID-19 VIRUS INFECTION: Status: ACTIVE | Noted: 2022-03-08

## 2022-03-08 PROBLEM — M51.36 DEGENERATIVE DISC DISEASE, LUMBAR: Status: ACTIVE | Noted: 2020-01-31

## 2022-03-08 PROBLEM — F90.9 ATTENTION DEFICIT HYPERACTIVITY DISORDER: Status: ACTIVE | Noted: 2022-03-08

## 2022-03-08 PROBLEM — N13.8 BENIGN PROSTATIC HYPERPLASIA WITH URINARY OBSTRUCTION: Status: ACTIVE | Noted: 2021-03-12

## 2022-03-08 PROBLEM — M10.9 GOUT: Status: ACTIVE | Noted: 2022-03-08

## 2022-03-08 PROBLEM — M54.16 LUMBAR RADICULOPATHY: Status: ACTIVE | Noted: 2020-01-31

## 2022-03-08 PROBLEM — R73.01 IMPAIRED FASTING GLUCOSE: Status: ACTIVE | Noted: 2022-03-08

## 2022-03-08 PROBLEM — I11.9 HYPERTENSIVE HEART DISEASE WITHOUT CONGESTIVE HEART FAILURE: Status: ACTIVE | Noted: 2022-03-08

## 2022-03-08 PROBLEM — F41.9 ANXIETY: Status: ACTIVE | Noted: 2022-03-08

## 2022-03-08 PROBLEM — G47.00 INSOMNIA: Status: ACTIVE | Noted: 2022-03-08

## 2022-03-08 PROBLEM — I49.3 PVCS (PREMATURE VENTRICULAR CONTRACTIONS): Status: ACTIVE | Noted: 2022-03-08

## 2022-03-08 PROBLEM — Z86.16 HISTORY OF 2019 NOVEL CORONAVIRUS DISEASE (COVID-19): Status: ACTIVE | Noted: 2022-03-08

## 2022-03-08 PROBLEM — N42.30 DYSPLASIA OF PROSTATE: Status: ACTIVE | Noted: 2022-03-08

## 2022-03-08 PROBLEM — N40.1 BENIGN PROSTATIC HYPERPLASIA WITH URINARY OBSTRUCTION: Status: ACTIVE | Noted: 2021-03-12

## 2022-03-08 PROBLEM — R97.20 HIGH PROSTATE SPECIFIC ANTIGEN (PSA): Status: ACTIVE | Noted: 2021-12-22

## 2022-03-08 PROBLEM — I25.10 CORONARY ARTERY DISEASE: Status: ACTIVE | Noted: 2022-03-08

## 2022-03-08 PROCEDURE — 99204 OFFICE O/P NEW MOD 45 MIN: CPT | Performed by: PSYCHIATRY & NEUROLOGY

## 2022-03-08 RX ORDER — ATORVASTATIN CALCIUM 40 MG/1
TABLET, FILM COATED ORAL EVERY 24 HOURS
COMMUNITY

## 2022-03-08 RX ORDER — TRAMADOL HYDROCHLORIDE 50 MG/1
TABLET ORAL EVERY 24 HOURS
COMMUNITY

## 2022-03-08 RX ORDER — APIXABAN 5 MG/1
5 TABLET, FILM COATED ORAL 2 TIMES DAILY
COMMUNITY
Start: 2022-01-13

## 2022-03-08 NOTE — ASSESSMENT & PLAN NOTE
He has a prior history of obstructive sleep apnea, test not available  I reviewed CPAP data which shows no usage this year, and no significant use it since at least November 2021  We reviewed that given his medical history of atrial fibrillation with an ablation, continued use of CPAP would be important if he has obstructive sleep apnea  He has a Dorian machine which is under recall  We discussed that one  option would be to resume his use of CPAP now leading up to approximately 7 days prior to a polysomnogram   He can then discontinue CPAP temporarily around his sleep test   Given that he has had weight loss, we can repeat a sleep study to see if his case of obstructive sleep apnea has resolved  We will also try to request his previous study  If the retest confirm sleep apnea, CPAP would likely be the best treatment for him  He reports that he did not have success with an oral appliance  In theory, and hypoglossal nerve stimulator could be an option but these have not been well studied in sleep apnea patients who have had an ablation after atrial fibrillation  There is data supporting the use of CPAP in this regard to help prevent recurrence of atrial fibrillation but we cannot say the same for a  hypoglossal nerve stimulator at this point

## 2022-03-08 NOTE — PATIENT INSTRUCTIONS
As we discussed, sleep apnea can cause recurrence of atrial fibrillation after a cardioversion, if it is not treated  I would recommend resuming use of the machine  Given your weight loss, a repeat test would make sense  I would recommend stopping CPAP before the test for about 5 days and then resuming after until we have the results  For the test, I would recommend drinking your usual amount of alcohol (please arrange for a  if needed) though I also agree with your plan to cut back     Nursing Support:  When: Monday through Friday 7A-5PM except holidays  Where: Our direct line is [phone]  If you are having a true emergency please call 911  In the event that the line is busy or it is after hours please leave a voice message and we will return your call  Please speak clearly, leaving your full name, birth date, best number to reach you and the reason for your call  Medication refills: We will need the name of the medication, the dosage, the ordering provider, whether you get a 30 or 90 day refill, and the pharmacy name and address  Medications will be ordered by the provider only  Nurses cannot call in prescriptions  Please allow 7 days for medication refills  Physician requested updates: If your provider requested that you call with an update after starting medication, please be ready to provide us the medication and dosage, what time you take your medication, the time you attempt to fall asleep, time you fall asleep, when you wake up, and what time you get out of bed  Sleep Study Results: We will contact you with sleep study results and/or next steps after the physician has reviewed your testing

## 2022-03-08 NOTE — PROGRESS NOTES
Review of Systems      Genitourinary need to urinate more than twice a night   Cardiology none   Gastrointestinal none   Neurology need to move extremities   Constitutional none   Integumentary none   Psychiatry anxiety and aggressiveness or irritability   Musculoskeletal joint pain, back pain and legs twitching/jerking   Pulmonary none   ENT none   Endocrine none   Hematological none

## 2022-03-08 NOTE — PROGRESS NOTES
Assessment/Plan:      1  AUGUSTA (obstructive sleep apnea)  Assessment & Plan:  He has a prior history of obstructive sleep apnea, test not available  I reviewed CPAP data which shows no usage this year, and no significant use it since at least November 2021  We reviewed that given his medical history of atrial fibrillation with an ablation, continued use of CPAP would be important if he has obstructive sleep apnea  He has a Dorian machine which is under recall  We discussed that one  option would be to resume his use of CPAP now leading up to approximately 7 days prior to a polysomnogram   He can then discontinue CPAP temporarily around his sleep test   Given that he has had weight loss, we can repeat a sleep study to see if his case of obstructive sleep apnea has resolved  We will also try to request his previous study  If the retest confirm sleep apnea, CPAP would likely be the best treatment for him  He reports that he did not have success with an oral appliance  In theory, and hypoglossal nerve stimulator could be an option but these have not been well studied in sleep apnea patients who have had an ablation after atrial fibrillation  There is data supporting the use of CPAP in this regard to help prevent recurrence of atrial fibrillation but we cannot say the same for a  hypoglossal nerve stimulator at this point  He describes being an active sleeper but not clearly having dream enactment  We will use a REM sleep behavior disorder montage on this test to further assess  Orders:  -     RBD Diagnostic Sleep Study; Future; Expected date: 03/09/2022    2  Primary insomnia  Comments:  Has been using trazodone for years  We discussed it is conceivable this could be causing morning sleepiness  3  Daily consumption of alcohol  Comments:   We reviewed that he would be ideal to cut back on alcohol consumption as this may affect the quality and also can contribute to atrial fibrillation or sleep apnea    4  Sleep apnea, unspecified  -     Ambulatory Referral to Sleep Medicine    5  Atrial fibrillation status post cardioversion [facility])  -     RBD Diagnostic Sleep Study; Future; Expected date: 03/09/2022         Subjective:      Patient ID: Елена Silva is a 69 y o  male  The patient is a 68-year-old male who presents for assessment for obstructive sleep apnea  He has a prior diagnosis of obstructive sleep apnea approximately 7 years ago at [facility] , test not available at the time of dictation  He was treated with CPAP  He recalls he had tiredness at the time of diagnosis, weighted about 200 pounds  He stopped using CPAP in 2021, he had a Dorian machine which was under recall  He recalls when he used CPAP he felt more refreshed and lost weight  He notes Covid-19 in December 2021, had recent recurrence of atrial fibrillation and had a cardioversion about 2 weeks ago  He is retired  He has a history of insomnia and has variability in his bedtime  He has been on trazodone 50 mg for approximately 8 years  He reports a bedtime of 10:00 p m- 1130 pm  He awakens twice to urinate and is out of bed at 8 AM-830 AM        He feels sleepy during the day, most notably in the morning  He wonders if trazodone may be contributing to this  He takes naps that can last 1 5 hours 2 days a week  He does not feel refreshed after napping  Does not doze     He drinks caffeine -20 oz of coffee a day  He drinks an average 2-3 beers a day, trying to cut back  He is a former cigarette smoker  He sleeps in a separate room from his wife  He does not think he snores anymore, better with weight loss  No gasping or witnessed apneas     No hx sleepwalking  His wife describes him as active sleeper  He does not have discrete dream recall  Trazodone he feels causes restless legs        [place] Sleepiness Scale:     Sitting and reading: Would never doze  Watching TV: Moderate chance of dozing  Sitting, inactive in a public place (e g  a theatre or a meeting): Would never doze  As a passenger in a car for an hour without a break: Would never doze  Lying down to rest in the afternoon when circumstances permit: Moderate chance of dozing  Sitting and talking to someone: Would never doze  Sitting quietly after a lunch without alcohol: Would never doze  In a car, while stopped for a few minutes in traffic: Would never doze  Total score: 4     The following portions of the patient's history were reviewed and updated as appropriate: allergies, current medications, past family history, past medical history, past social history, past surgical history, and problem list     Review of Systems    Genitourinary need to urinate more than twice a night   Cardiology none   Gastrointestinal none   Neurology need to move extremities   Constitutional none   Integumentary none   Psychiatry anxiety and aggressiveness or irritability   Musculoskeletal joint pain, back pain and legs twitching/jerking   Pulmonary none   ENT none   Endocrine none   Hematological none        Objective:  Neck Circumference: 15 inches      /60   Ht 5' 11" (1 803 m)   Wt 73 5 kg (162 lb)   BMI 22 59 kg/m²          Physical Exam  Constitutional:       Appearance: Normal appearance  HENT:      Head: Normocephalic and atraumatic  Mouth/Throat:      Comments: mallampati 4 airway, elongated soft palate , overbite  Eyes:      Pupils: Pupils are equal, round, and reactive to light  Cardiovascular:      Rate and Rhythm: Normal rate and regular rhythm  Pulses: Normal pulses  Heart sounds: Normal heart sounds  Pulmonary:      Effort: Pulmonary effort is normal       Breath sounds: Normal breath sounds  Musculoskeletal:      Right lower leg: No edema  Left lower leg: No edema  Neurological:      Mental Status: He is alert  Psychiatric:         Mood and Affect: Mood normal          Behavior: Behavior normal          Thought Content:  Thought content normal          Judgment: Judgment normal          Data reviewed- PAP data, labs including basic metabolic panel, urology notes to correlate with history

## 2022-03-16 ENCOUNTER — HOSPITAL ENCOUNTER (OUTPATIENT)
Dept: SLEEP CENTER | Facility: HOSPITAL | Age: 67
Discharge: HOME | End: 2022-03-16
Attending: PSYCHIATRY & NEUROLOGY
Payer: MEDICARE

## 2022-03-16 DIAGNOSIS — I48.91 ATRIAL FIBRILLATION STATUS POST CARDIOVERSION (HCC): ICD-10-CM

## 2022-03-16 DIAGNOSIS — G47.33 OSA (OBSTRUCTIVE SLEEP APNEA): ICD-10-CM

## 2022-03-16 PROCEDURE — 95810 POLYSOM 6/> YRS 4/> PARAM: CPT

## 2022-03-16 PROCEDURE — 95810 POLYSOM 6/> YRS 4/> PARAM: CPT | Performed by: PSYCHIATRY & NEUROLOGY

## 2022-03-17 DIAGNOSIS — G25.81 RLS (RESTLESS LEGS SYNDROME): Primary | ICD-10-CM

## 2022-03-17 DIAGNOSIS — E61.1 IRON DEFICIENCY: ICD-10-CM

## 2022-03-17 NOTE — PROGRESS NOTES
Sleep Study Documentation    Pre-Sleep Study       Sleep testing procedure explained to patient:YES    Patient napped prior to study:NO    Caffeine:Dayshift worker after 12PM   Caffeine use:YES- coffee  18 ounces or more    Alcohol:Dayshift workers after 5PM: Alcohol use:YES-Beer 2 to 3 servings, liquor 1 serving    Typical day for patient:YES       Study Documentation    Sleep Study Indications: BMI > 30 and EDS    Sleep Study: Diagnostic   Snore: moderate  Supplemental O2: no    O2 flow rate (L/min) range N/A  O2 flow rate (L/min) final N/A  Minimum SaO2 86%  Baseline SaO2 93%      EKG abnormalities: yes:  EPOCH example and comments: PVCs epoch 435, 459, 491, 771, 1051, etc   PVC couplets 436, 490, 526, 898 etch    EEG abnormalities: no    Sleep Study Recorded < 2 hours: N/A    Sleep Study Recorded > 2 hours but incomplete study: N/A    Sleep Study Recorded 6 hours but no sleep obtained: NO    Patient classification: retired       Post-Sleep Study    Medication used at bedtime or during sleep study:YES prescription sleep aid    Patient reports time it took to fall asleep:greater than 60 minutes    Patient reports waking up during study:1 to 2 times  Patient reports returning to sleep in 10 to 30 minutes  Patient reports sleeping 2 to 4 hours without dreaming  Patient reports sleep during study:worse than usual    Patient rated sleepiness: Not sleepy or tired    PAP treatment:no

## 2022-03-18 ENCOUNTER — TELEPHONE (OUTPATIENT)
Dept: SLEEP CENTER | Facility: CLINIC | Age: 67
End: 2022-03-18

## 2022-03-18 NOTE — TELEPHONE ENCOUNTER
Spoke with the patient advised he resume using his CPAP per Dr Siu Neigh  Patient will have blood work drawn   RX to be mailed to the patient

## 2022-03-18 NOTE — TELEPHONE ENCOUNTER
----- Message from Elyse Schlatter, MD sent at 3/17/2022  8:34 PM EDT -----  Test shows mild AUGUSTA  In general it is recommended he resume CPAP based on this test   He slept very poorly- he should also have iron studies completed as he had very significant restless legs before sleep, I wrote an order can you get it to him

## 2022-04-15 ENCOUNTER — OFFICE VISIT (OUTPATIENT)
Dept: SLEEP CENTER | Facility: CLINIC | Age: 67
End: 2022-04-15
Payer: MEDICARE

## 2022-04-15 VITALS
HEIGHT: 71 IN | WEIGHT: 169 LBS | HEART RATE: 61 BPM | BODY MASS INDEX: 23.66 KG/M2 | SYSTOLIC BLOOD PRESSURE: 127 MMHG | DIASTOLIC BLOOD PRESSURE: 74 MMHG

## 2022-04-15 DIAGNOSIS — I49.3 PVCS (PREMATURE VENTRICULAR CONTRACTIONS): ICD-10-CM

## 2022-04-15 DIAGNOSIS — G25.81 RLS (RESTLESS LEGS SYNDROME): ICD-10-CM

## 2022-04-15 DIAGNOSIS — I48.91 ATRIAL FIBRILLATION STATUS POST CARDIOVERSION (HCC): ICD-10-CM

## 2022-04-15 DIAGNOSIS — G47.33 OSA (OBSTRUCTIVE SLEEP APNEA): Primary | ICD-10-CM

## 2022-04-15 DIAGNOSIS — F51.01 PRIMARY INSOMNIA: ICD-10-CM

## 2022-04-15 PROCEDURE — 99214 OFFICE O/P EST MOD 30 MIN: CPT | Performed by: PSYCHIATRY & NEUROLOGY

## 2022-04-15 RX ORDER — TRAZODONE HYDROCHLORIDE 50 MG/1
TABLET ORAL
COMMUNITY
Start: 2022-03-26

## 2022-04-15 NOTE — PROGRESS NOTES
Assessment/Plan:      1  AUGUSTA (obstructive sleep apnea)  Assessment & Plan: We reviewed that treatment for AUGUSTA is still present, treatment is recommended-  He had an oral appliance in the past which did not work   In general, CPAP would be the best treatment for him, he has concern regarding this due to comfort and the recall  His most recent sleep study did not qualify for the inspire device, his AHI was too low  That said he did not sleep well on the test, in the future we could consider a repeat test if needed  He seemed interested in positional therapy as a option for his positional obstructive sleep apnea  We have discussed methods to obtain this but there are limitations such as pain and discomfort  I will reorder CPAP supplies in his he wishes to resume using this  Orders:  -     PAP DME Resupply/Reorder    2  PVCs (premature ventricular contractions)    3  Atrial fibrillation status post cardioversion ([facility] [address] )    4  Primary insomnia  Assessment & Plan:  He identifies insomnia with difficulty initiating sleep  It seems he is likely trying to go to sleep too early  I recommend he lower his dose of trazodone to half a pill at night, delay his bedtime 1 hour, and keep sleep logs to bring to his next visit  We reviewed that in general would be best to avoid medication for insomnia, behavioral methods may be helpful  5  RLS (restless legs syndrome)  Comments:  Intermittent restless leg syndrome, was prominent on this test   No sign of iron deficiency  I asked him to chart out how often this occurs         Subjective:      Patient ID: Chun Oneill is a 69 y o  male  Marisela Mandujano returns in followup after a PSG , notes he slept poorly on the test  Had RLS that night which he experiences on trazodone  I ordered blood iron studies which he completed at [facility]  Takes trazodone nightly at 9 30 pm   Aims to sleep 10-11 pm  , falls asleep quickly    Without trazodone, can take hours to fall asleep   Wakes up at 7-8 am   Wakes 2-3 times a night    He drinks alcohol 2-3 drinks in the evening     Labs-  Ferritin 74  Iron sat 25%  4/2022          [place] Sleepiness Scale:     Sitting and reading: Would never doze  Watching TV: Would never doze  Sitting, inactive in a public place (e g  a theatre or a meeting): Would never doze  As a passenger in a car for an hour without a break: Would never doze  Lying down to rest in the afternoon when circumstances permit: Would never doze  Sitting and talking to someone: Would never doze  Sitting quietly after a lunch without alcohol: Would never doze  In a car, while stopped for a few minutes in traffic: Would never doze  Total score: 0     The following portions of the patient's history were reviewed and updated as appropriate: allergies, current medications, past family history, past medical history, past social history, past surgical history, and problem list     Review of Systems    Genitourinary need to urinate more than twice a night   Cardiology none   Gastrointestinal none   Neurology none   Constitutional fatigue   Integumentary none   Psychiatry anxiety   Musculoskeletal back pain and leg cramps   Pulmonary none   ENT none   Endocrine none   Hematological none           Objective:    /74 (BP Location: Left arm, Patient Position: Sitting, Cuff Size: Large)   Pulse 61   Ht 5' 11" (1 803 m)   Wt 76 7 kg (169 lb)   BMI 23 57 kg/m²         I spent 31 minutes on the day of the patient's visit, in documentation, face-to-face with the patient, and in chart review

## 2022-04-15 NOTE — ASSESSMENT & PLAN NOTE
We reviewed that treatment for AUGUSTA is still present, treatment is recommended-  He had an oral appliance in the past which did not work   In general, CPAP would be the best treatment for him, he has concern regarding this due to comfort and the recall  His most recent sleep study did not qualify for the inspire device, his AHI was too low  That said he did not sleep well on the test, in the future we could consider a repeat test if needed  He seemed interested in positional therapy as a option for his positional obstructive sleep apnea  We have discussed methods to obtain this but there are limitations such as pain and discomfort  I will reorder CPAP supplies in his he wishes to resume using this

## 2022-04-15 NOTE — ASSESSMENT & PLAN NOTE
He identifies insomnia with difficulty initiating sleep  It seems he is likely trying to go to sleep too early  I recommend he lower his dose of trazodone to half a pill at night, delay his bedtime 1 hour, and keep sleep logs to bring to his next visit  We reviewed that in general would be best to avoid medication for insomnia, behavioral methods may be helpful

## 2022-04-15 NOTE — PROGRESS NOTES
Review of Systems      Genitourinary need to urinate more than twice a night   Cardiology none   Gastrointestinal none   Neurology none   Constitutional fatigue   Integumentary none   Psychiatry anxiety   Musculoskeletal back pain and leg cramps   Pulmonary none   ENT none   Endocrine none   Hematological none

## 2022-04-15 NOTE — PATIENT INSTRUCTIONS
1) try to get in bed when sleepy  2) try to think about RLS , how often do you get it   3) If you did not want to use CPAP, you can try a positional device to prevent sleeping on your back such as zzoma  Using CPAP generally is recommended  4) I would recommend cutting down trazodone to 1/2 pill at night, consider leaving     It is very important to avoid driving while drowsy, this can be very dangerous or even cause serious injury or death  If sleepy, it is not safe to get behind the wheel  If you are driving and feels sleepy, it is very important to pull over right away  Even losing control of the car for a split second can be deadly  If you feel you cannot control when sleepiness occurs and cannot prevented, it is important to not drive at all until this improves  Please let me know if you experience this as it is very important  Insomnia   AMBULATORY CARE:   Insomnia  is a condition that makes it hard to fall or stay asleep  Lack of sleep can lead to attention or memory problems during the day  You may also be danielle, depressed, clumsy, or have headaches  Contact your healthcare provider if:   · Your symptoms do not get better, or they get worse  · You begin to use drugs or alcohol to fall asleep  · You have questions or concerns about your condition or care  Medicines:   · Medicines  may help you sleep more regularly or help you feel less anxious  · Take your medicine as directed  Contact your healthcare provider if you think your medicine is not helping or if you have side effects  Tell him or her if you are allergic to any medicine  Keep a list of the medicines, vitamins, and herbs you take  Include the amounts, and when and why you take them  Bring the list or the pill bottles to follow-up visits  Carry your medicine list with you in case of an emergency  What you can do to improve your sleep:   · Create a sleep schedule  Try to go to sleep and wake up at the same times every day  Keep a record of your sleep patterns, and any sleeping problems you have  Bring the record to follow-up visits with healthcare providers  · Do not take naps  Naps could make it hard for you to fall asleep at bedtime  · Keep your bedroom cool, quiet, and dark  Turn on white noise, such as a fan, to help you relax  Do not use your bed for any activity that will keep you awake  Do not read, exercise, eat, or watch TV in your bedroom  · Get up if you do not fall asleep within 20 minutes  Move to another room and do something relaxing until you become sleepy  · Limit caffeine, alcohol, and food to earlier in the day  Only drink caffeine in the morning  Do not drink alcohol within 6 hours of bedtime  Do not eat a heavy meal right before you go to bed  · Exercise regularly  Daily exercise may help you sleep better  Do not exercise within 4 hours of bedtime  Follow up with your healthcare provider as directed: Your healthcare provider may refer you for cognitive behavioral therapy  A behavioral therapist may help you find ways to relax, decrease stress, and improve sleep  Write down your questions so you remember to ask them during your visits  © Copyright [employer] 2022 Information is for End User's use only and may not be sold, redistributed or otherwise used for commercial purposes  All illustrations and images included in CareNotes® are the copyrighted property of [employer]  or Elza Silva  The above information is an  only  It is not intended as medical advice for individual conditions or treatments  Talk to your doctor, nurse or pharmacist before following any medical regimen to see if it is safe and effective for you

## 2022-04-18 ENCOUNTER — TELEPHONE (OUTPATIENT)
Dept: SLEEP CENTER | Facility: CLINIC | Age: 67
End: 2022-04-18

## 2024-03-19 DIAGNOSIS — M79.645 THUMB PAIN, LEFT: Primary | ICD-10-CM

## 2024-04-17 ENCOUNTER — OFFICE VISIT (OUTPATIENT)
Dept: OBGYN CLINIC | Facility: CLINIC | Age: 69
End: 2024-04-17
Payer: MEDICARE

## 2024-04-17 ENCOUNTER — APPOINTMENT (OUTPATIENT)
Dept: RADIOLOGY | Facility: CLINIC | Age: 69
End: 2024-04-17
Payer: MEDICARE

## 2024-04-17 VITALS
HEIGHT: 71 IN | WEIGHT: 172 LBS | BODY MASS INDEX: 24.08 KG/M2 | HEART RATE: 76 BPM | SYSTOLIC BLOOD PRESSURE: 114 MMHG | DIASTOLIC BLOOD PRESSURE: 69 MMHG

## 2024-04-17 DIAGNOSIS — M79.642 LEFT HAND PAIN: ICD-10-CM

## 2024-04-17 DIAGNOSIS — M19.049 HAND ARTHRITIS: Primary | ICD-10-CM

## 2024-04-17 DIAGNOSIS — M79.641 RIGHT HAND PAIN: ICD-10-CM

## 2024-04-17 PROCEDURE — 99203 OFFICE O/P NEW LOW 30 MIN: CPT | Performed by: ORTHOPAEDIC SURGERY

## 2024-04-17 PROCEDURE — 73130 X-RAY EXAM OF HAND: CPT

## 2024-04-17 RX ORDER — VALSARTAN AND HYDROCHLOROTHIAZIDE 80; 12.5 MG/1; MG/1
TABLET, FILM COATED ORAL EVERY 24 HOURS
COMMUNITY

## 2024-04-17 NOTE — PROGRESS NOTES
Assessment/Plan:  1. Hand arthritis  Ambulatory Referral to PT/OT Hand Therapy      2. Right hand pain        3. Left hand pain  XR hand 3+ vw right    XR hand 3+ vw left          Subjective history, physical examination performed, diagnostic imaging reviewed at today's visit  Diagnosis was discussed diffused arthritis of multiple joints in bilateral hands.   Treatment options were discussed which included nonoperative and operative treatment.    Risks, benefits, and realistic expectations for treatment options were discussed.    The patient was given an opportunity to ask questions.  Questions were answered to the patient's satisfaction.    Through shared decision making, the patient decided to move forward with initiating hand therapy to work on joint preservation of multiple joints within hand and modalities to include homeopathic remedies and paraffin wax dips.  Hand therapy referral was given to patient to work on joint preservation.  Advised patient he may apply topical gels (arnica, volteran) and use paraffin wax dips for bilateral hands as needed for comfort.  Patient will follow-up on an as-needed basis if his symptoms worsen.      cc: Bilateral hand pain    Subjective:   Efra Roldan is a right hand dominant 69 y.o. male who presents bilateral hand and finger pain. Patient states over the last 7 months he has been having pain within multiple joints within his fingers of both hands. He states he has been using his hands a lot with doing wood work and playing the guitar which can aggravate pain. She describes it as an achy pain. He does notice he numbness around his left thumb after lacerating his left hand several years ago irritating his nerve.  Patient states he did have a right ulnar shortening osteotomy several years ago.       Review of Systems   Constitutional:  Positive for activity change. Negative for chills, fever and unexpected weight change.   HENT:  Negative for hearing loss, nosebleeds and  sore throat.    Eyes:  Negative for pain, redness and visual disturbance.   Respiratory:  Negative for cough, shortness of breath and wheezing.    Cardiovascular:  Negative for chest pain, palpitations and leg swelling.   Gastrointestinal:  Negative for abdominal pain, nausea and vomiting.   Endocrine: Negative for polyphagia and polyuria.   Genitourinary:  Negative for dysuria and hematuria.   Musculoskeletal:  Positive for arthralgias.        See HPI   Skin:  Negative for rash and wound.   Neurological:  Negative for dizziness, numbness and headaches.   Psychiatric/Behavioral:  Negative for decreased concentration and suicidal ideas. The patient is not nervous/anxious.          Past Medical History:   Diagnosis Date    Atrial fibrillation (HCC)     CAD (coronary artery disease)     CPAP (continuous positive airway pressure) dependence     Hyperlipidemia     Hypertension     Sleep apnea        Past Surgical History:   Procedure Laterality Date    ABLATION OF DYSRHYTHMIC FOCUS      CARDIAC CATHETERIZATION      CARDIOVERSION      COLON SURGERY      CORONARY STENT PLACEMENT      KNEE ARTHROSCOPY W/ MENISCECTOMY N/A     DE LAPAROSCOPY SURG RPR INITIAL INGUINAL HERNIA Bilateral 2018    Procedure: REPAIR BILATERAL HERNIA INGUINAL, LAPAROSCOPIC;  Surgeon: Reggie Givens MD;  Location: [facility];  Service: General    DE RPR UMBILICAL HRNA 5 YRS/> REDUCIBLE N/A 2018    Procedure: REPAIR HERNIA UMBILICAL OPEN;  Surgeon: Reggie Givens MD;  Location:  MAIN OR;  Service: General    WISDOM TOOTH EXTRACTION      WRIST SURGERY Right        Family History   Problem Relation Age of Onset    Cancer Mother     Heart disease Father     Cancer Sister        Social History     Occupational History    Not on file   Tobacco Use    Smoking status: Former     Current packs/day: 0.00     Types: Cigarettes     Quit date:      Years since quittin.3    Smokeless tobacco: Never   Vaping Use    Vaping status: Never  Used   Substance and Sexual Activity    Alcohol use: Yes     Alcohol/week: 21.0 standard drinks of alcohol     Types: 21 Cans of beer per week    Drug use: No    Sexual activity: Not Currently         Current Outpatient Medications:     ASHWAGANDHA PO, Ashwagandha, Disp: , Rfl:     atorvastatin (LIPITOR) 40 mg tablet, every 24 hours, Disp: , Rfl:     Cholecalciferol (VITAMIN D3 PO), Take by mouth, Disp: , Rfl:     escitalopram (LEXAPRO) 20 mg tablet, Take 10 mg by mouth daily  , Disp: , Rfl:     Menaquinone-7 (VITAMIN K2 PO), Take by mouth, Disp: , Rfl:     traZODone (DESYREL) 50 mg tablet, 1 TABLET AT BEDTIME ORALLY ONCE A DAY 90 DAYS, Disp: , Rfl:     valsartan-hydrochlorothiazide (DIOVAN-HCT) 80-12.5 MG per tablet, every 24 hours, Disp: , Rfl:     verapamil (CALAN) 120 mg tablet, Take 240 mg by mouth daily  , Disp: , Rfl:     alfuzosin (UROXATRAL) 10 mg 24 hr tablet, Take 10 mg by mouth daily   (Patient not taking: Reported on 4/15/2022), Disp: , Rfl:     Eliquis 5 MG, Take 5 mg by mouth 2 (two) times a day (Patient not taking: Reported on 4/17/2024), Disp: , Rfl:     enalapril (VASOTEC) 20 mg tablet, Take 20 mg by mouth daily (Patient not taking: Reported on 4/17/2024), Disp: , Rfl:     traMADol (ULTRAM) 50 mg tablet, every 24 hours (Patient not taking: Reported on 4/15/2022), Disp: , Rfl:     Allergies   Allergen Reactions    Bupropion Hives       Objective:  Vitals:    04/17/24 1406   BP: 114/69   Pulse: 76       The patient was awake, alert, and oriented to person, place, and time.  No acute distress.  Normocephalic.  EOMI.  Mucous membranes moist.  Normal respiratory effort.    Examination of the both hands performed.  Skin was intact.  No swelling or ecchymosis.  + deformities at DIP joints in both hands.  Hand and fingers were warm and well-perfused.  Capillary refill was brisk. Sensation grossly intact. Full active range of motion of the elbows, forearms, wrists, and fingers.  5/5 elbow flexors/extensors,  wrist flexor/extensors, and .       Imaging/Diagnostic Studies:    I reviewed imaging studies dated 4/17/2024 which included bilateral hand XR.  These imaging studies demonstrated diffuse arthritic changes with multiple joints of bilateral hands with bone-on-bone degenerative changes, especially at DIP joints and thumb IP joints.        This document was created using speech voice recognition software.   Grammatical errors, random word insertions, pronoun errors, and incomplete sentences are an occasional consequence of this system due to software limitations, ambient noise, and hardware issues.   Any formal questions or concerns about content, text, or information contained within the body of this dictation should be directly addressed to the provider for clarification.    Scribe Attestation      I,:  Martinez Dixon am acting as a scribe while in the presence of the attending physician.:       I,:  Jennie Nunez MD personally performed the services described in this documentation    as scribed in my presence.:

## 2024-05-03 ENCOUNTER — EVALUATION (OUTPATIENT)
Dept: OCCUPATIONAL THERAPY | Facility: CLINIC | Age: 69
End: 2024-05-03
Payer: MEDICARE

## 2024-05-03 DIAGNOSIS — M19.049 HAND ARTHRITIS: ICD-10-CM

## 2024-05-03 PROCEDURE — 97165 OT EVAL LOW COMPLEX 30 MIN: CPT | Performed by: OCCUPATIONAL THERAPIST

## 2024-05-03 PROCEDURE — 97140 MANUAL THERAPY 1/> REGIONS: CPT | Performed by: OCCUPATIONAL THERAPIST

## 2024-05-03 PROCEDURE — 97110 THERAPEUTIC EXERCISES: CPT | Performed by: OCCUPATIONAL THERAPIST

## 2024-05-03 NOTE — PROGRESS NOTES
OT Evaluation     Today's date: 5/3/2024  Patient name: Efra Roldan  : 1955  MRN: [id number]  Referring provider: Jennie Nunez,*  Dx:   Encounter Diagnosis     ICD-10-CM    1. Hand arthritis  M19.049 Ambulatory Referral to PT/OT Hand Therapy                     Assessment  Assessment details: Efra presents with B hand and stiffness that started 5 years ago . He has deformity in the Ips . He has functional ROM of digits and wrist . He has mild weakness for  and pinch . He has mild intrinsic tightness, He has adductor tightness. He is retired   but is very active with the gym , and playing [employer].  Impairments: activity intolerance, impaired physical strength, lacks appropriate home exercise program and pain with function  Functional limitations: pain with gripping , pinching , bottles/jars ,  Symptom irritability: moderateUnderstanding of Dx/Px/POC: good   Prognosis: good    Goals  STG- 1 wk  1- I with HEP  2- worst pain 5/10    LTG- 6 wks  1- worst pain 3/10  2- improve pinches 3#  3- improve  5#  4- no intrinsic tightness     Plan  Patient would benefit from: OT eval and skilled occupational therapy  Planned modality interventions: cryotherapy and thermotherapy: hydrocollator packs  Planned therapy interventions: activity modification, joint mobilization, IASTM, kinesiology taping, manual therapy, massage, strengthening, stretching, fine motor coordination training, behavior modification, home exercise program and functional ROM exercises  Frequency: 2x week  Duration in weeks: 4  Plan of Care beginning date: 5/3/2024  Treatment plan discussed with: patient        Subjective Evaluation    History of Present Illness  Mechanism of injury: Efra reports progressive onset of B hand pain and stiffness that started 4-5 years ago .   Quality of life: good    Patient Goals  Patient goals for therapy: increased strength, independence with ADLs/IADLs, return to sport/leisure activities and  decreased pain    Pain  Current pain ratin  At best pain ratin  At worst pain ratin  Location: B hands CMC and digits  Quality: dull ache and tight  Relieving factors: rest  Exacerbated by: guitar using tools , gym, windsufing.    Social Support  Steps to enter house: no  Stairs in house: no (basement)   Lives in: one-story house  Lives with: spouse    Employment status: not working  Hand dominance: right    Treatments  No previous or current treatments        Objective     Observations     Additional Observation Details  Visible DJD in DIP joints B     Tenderness     Additional Tenderness Details  Tender B CMC , Adductor , DIP's all     Neurological Testing     Additional Neurological Details  Denies paresthesias     Active Range of Motion     Left Wrist   Wrist flexion: 60 degrees   Wrist extension: 60 degrees   Radial deviation: 20 degrees   Ulnar deviation: 22 degrees     Right Wrist   Wrist flexion: 60 degrees   Wrist extension: 55 degrees   Radial deviation: 20 degrees   Ulnar deviation: 22 degrees     Left Thumb   Kapandji score: 10 degrees      Right Thumb   Kapandji score: 9 degrees    Strength/Myotome Testing     Left Wrist/Hand   Normal wrist strength     (2nd hand position)     Trial 1: 67    Thumb Strength  Key/Lateral Pinch     Trial 1: 15  Palmar/Three-Point Pinch     Trial 1: 5    Right Wrist/Hand   Normal wrist strength     (2nd hand position)     Trial 1: 67    Thumb Strength   Key/Lateral Pinch     Trial 1: 15  Palmar/Three-Point Pinch     Trial 1: 6    Tests     Left Wrist/Hand   Positive intrinsic muscle tightness.     Right Wrist/Hand   Positive intrinsic muscle tightness.              Precautions: Afib, cardiac       Manuals 5/3  IE             Graston B hands  6m            MOB CMC , MCP 4m            MFR ADD 2m            Intrinsic stretching  2m            HEP  4 xday            Behavior mod 2m                                                                                           Ther Ex             TGE  10x            AROM  10x            Wrist  ext  2#  3x10            Flex   Yellow  3x10                                                                Ther Activity                                       Gait Training                                       Modalities             MH  6m

## 2024-05-06 ENCOUNTER — OFFICE VISIT (OUTPATIENT)
Dept: OCCUPATIONAL THERAPY | Facility: CLINIC | Age: 69
End: 2024-05-06
Payer: MEDICARE

## 2024-05-06 DIAGNOSIS — M19.049 HAND ARTHRITIS: Primary | ICD-10-CM

## 2024-05-06 PROCEDURE — 97140 MANUAL THERAPY 1/> REGIONS: CPT | Performed by: OCCUPATIONAL THERAPIST

## 2024-05-06 PROCEDURE — 97110 THERAPEUTIC EXERCISES: CPT | Performed by: OCCUPATIONAL THERAPIST

## 2024-05-06 NOTE — PROGRESS NOTES
Daily Note     Today's date: 2024  Patient name: Efra Roldan  : 1955  MRN: [id number]  Referring provider: Jennie Nunez,*  Dx:   Encounter Diagnosis     ICD-10-CM    1. Hand arthritis  M19.049                      Subjective: I am feeling better      Objective: See treatment diary below      Assessment: Tolerated treatment well. Patient  has improved pain . He has good carryover for ADL and recreation       Plan: Continue per plan of care.      Precautions: Afib, cardiac       Manuals 5/3  IE             Graston B hands  6m 6m           MOB CMC , MCP 4m 4m           MFR ADD 2m 2m           Intrinsic stretching  2m 2m           HEP  4 xday            Behavior mod 2m                                                                                          Ther Ex             TGE  10x 10x           AROM  10x 10x           Wrist  ext  2#  3x10 2#  3x10           Flex   Yellow  3x10 Yellow  3x10                                                               Ther Activity                                       Gait Training                                       Modalities             MH  6m 6m

## 2024-05-16 ENCOUNTER — OFFICE VISIT (OUTPATIENT)
Dept: OCCUPATIONAL THERAPY | Facility: CLINIC | Age: 69
End: 2024-05-16
Payer: MEDICARE

## 2024-05-16 DIAGNOSIS — M19.049 HAND ARTHRITIS: Primary | ICD-10-CM

## 2024-05-16 PROCEDURE — 97140 MANUAL THERAPY 1/> REGIONS: CPT | Performed by: OCCUPATIONAL THERAPIST

## 2024-05-16 PROCEDURE — 97110 THERAPEUTIC EXERCISES: CPT | Performed by: OCCUPATIONAL THERAPIST

## 2024-05-16 NOTE — PROGRESS NOTES
Daily Note     Today's date: 2024  Patient name: Efra Roldan  : 1955  MRN: [id number]  Referring provider: Jennie Nunez,*  Dx:   Encounter Diagnosis     ICD-10-CM    1. Hand arthritis  M19.049                      Subjective: my gout kicked up       Objective: See treatment diary below      Assessment: Tolerated treatment fair. Patient  has improved pain post tx       Plan: Continue per plan of care.      Precautions: Afib, cardiac       Manuals 5/3  IE            Mat KEYS hands  6m 6m 6m          MOB CMC , MCP 4m 4m 4m          MFR ADD 2m 2m 2m          Intrinsic stretching  2m 2m 2m          HEP  4 xday            Behavior mod 2m                                                                                          Ther Ex             TGE  10x 10x 10x          AROM  10x 10x 10x          Wrist  ext  2#  3x10 2#  3x10 2#  3x10          Flex   Yellow  3x10 Yellow  3x10 Yellow  3x10                                                              Ther Activity                                       Gait Training                                       Modalities             MH  6m 6m 6m

## 2024-05-23 ENCOUNTER — OFFICE VISIT (OUTPATIENT)
Dept: OCCUPATIONAL THERAPY | Facility: CLINIC | Age: 69
End: 2024-05-23
Payer: MEDICARE

## 2024-05-23 DIAGNOSIS — M19.049 HAND ARTHRITIS: Primary | ICD-10-CM

## 2024-05-23 PROCEDURE — 97140 MANUAL THERAPY 1/> REGIONS: CPT | Performed by: OCCUPATIONAL THERAPIST

## 2024-05-23 PROCEDURE — 97110 THERAPEUTIC EXERCISES: CPT | Performed by: OCCUPATIONAL THERAPIST

## 2024-05-23 NOTE — PROGRESS NOTES
Daily Note     Today's date: 2024  Patient name: Efra Roldan  : 1955  MRN: [id number]  Referring provider: Jennie Nunez,*  Dx:   Encounter Diagnosis     ICD-10-CM    1. Hand arthritis  M19.049                      Subjective: I am doing ok       Objective: See treatment diary below      Assessment: Tolerated treatment well. Patient  has improved pain post tx       Plan: Continue per plan of care.      Precautions: Afib, cardiac       Manuals 5/3  IE           Mat KEYS hands  6m 6m 6m 6m         MOB CMC , MCP 4m 4m 4m 4m         MFR ADD 2m 2m 2m 2m         Intrinsic stretching  2m 2m 2m 2m         HEP  4 xday            Behavior mod 2m                                                                                          Ther Ex             TGE  10x 10x 10x 10x         AROM  10x 10x 10x 10x         Wrist  ext  2#  3x10 2#  3x10 2#  3x10 3#  3x10         Flex   Yellow  3x10 Yellow  3x10 Yellow  3x10 Yellow  3x10         FA stab with hammer    Lg 3x10                                                Ther Activity                                       Gait Training                                       Modalities             MH  6m 6m 6m 6m

## 2024-05-30 ENCOUNTER — OFFICE VISIT (OUTPATIENT)
Dept: OCCUPATIONAL THERAPY | Facility: CLINIC | Age: 69
End: 2024-05-30
Payer: MEDICARE

## 2024-05-30 DIAGNOSIS — M19.049 HAND ARTHRITIS: Primary | ICD-10-CM

## 2024-05-30 PROCEDURE — 97110 THERAPEUTIC EXERCISES: CPT | Performed by: OCCUPATIONAL THERAPIST

## 2024-05-30 PROCEDURE — 97140 MANUAL THERAPY 1/> REGIONS: CPT | Performed by: OCCUPATIONAL THERAPIST

## 2024-05-30 NOTE — PROGRESS NOTES
Daily Note     Today's date: 2024  Patient name: Efra Roldan  : 1955  MRN: [id number]  Referring provider: Jennie Nunez,*  Dx:   Encounter Diagnosis     ICD-10-CM    1. Hand arthritis  M19.049                      Subjective: I am doing better      Objective: See treatment diary below      Assessment: Tolerated treatment well. Patient  has improved intrinsic tightness      Plan: Continue per plan of care.      Precautions: Afib, cardiac       Manuals 5/3  IE          Graston B hands  6m 6m 6m 6m 6m        MOB CMC , MCP 4m 4m 4m 4m 4m        MFR ADD 2m 2m 2m 2m 2m        Intrinsic stretching  2m 2m 2m 2m 2m        HEP  4 xday            Behavior mod 2m                                                                                          Ther Ex             TGE  10x 10x 10x 10x 10x        AROM  10x 10x 10x 10x 10x        Wrist  ext  2#  3x10 2#  3x10 2#  3x10 3#  3x10 3#  3x10        Flex   Yellow  3x10 Yellow  3x10 Yellow  3x10 Yellow  3x10 Yellow  3x10        FA stab with hammer    Lg 3x10 Lg   3x10                                               Ther Activity                                       Gait Training                                       Modalities             MH  6m 6m 6m 6m 6m

## 2024-06-06 ENCOUNTER — OFFICE VISIT (OUTPATIENT)
Dept: OCCUPATIONAL THERAPY | Facility: CLINIC | Age: 69
End: 2024-06-06
Payer: MEDICARE

## 2024-06-06 DIAGNOSIS — M19.049 HAND ARTHRITIS: Primary | ICD-10-CM

## 2024-06-06 PROCEDURE — 97140 MANUAL THERAPY 1/> REGIONS: CPT | Performed by: OCCUPATIONAL THERAPIST

## 2024-06-06 PROCEDURE — 97110 THERAPEUTIC EXERCISES: CPT | Performed by: OCCUPATIONAL THERAPIST

## 2024-06-06 NOTE — PROGRESS NOTES
Daily Note     Today's date: 2024  Patient name: Efra Roldan  : 1955  MRN: [id number]  Referring provider: Jennie Nunez,*  Dx:   Encounter Diagnosis     ICD-10-CM    1. Hand arthritis  M19.049                      Subjective: I am feeling better      Objective: See treatment diary below      Assessment: Tolerated treatment well. Patient  has improved pain . Good carryover      Plan: Continue per plan of care.      Precautions: Afib, cardiac       Manuals 5/3  IE         Graston B hands  6m 6m 6m 6m 6m 6m       MOB CMC , MCP 4m 4m 4m 4m 4m 4m       MFR ADD 2m 2m 2m 2m 2m 2m       Intrinsic stretching  2m 2m 2m 2m 2m 2m       HEP  4 xday            Behavior mod 2m                                                                                          Ther Ex             TGE  10x 10x 10x 10x 10x 10x       AROM  10x 10x 10x 10x 10x 1x0       Wrist  ext  2#  3x10 2#  3x10 2#  3x10 3#  3x10 3#  3x10 3#  3x10       Flex   Yellow  3x10 Yellow  3x10 Yellow  3x10 Yellow  3x10 Yellow  3x10 Yellow  3x10       FA stab with hammer    Lg 3x10 Lg   3x10 LG  3x10                                              Ther Activity                                       Gait Training                                       Modalities             MH  6m 6m 6m 6m 6m 6m

## 2025-07-31 ENCOUNTER — APPOINTMENT (OUTPATIENT)
Age: 70
End: 2025-07-31
Payer: MEDICARE

## 2025-07-31 DIAGNOSIS — R73.01 IMPAIRED FASTING GLUCOSE: ICD-10-CM

## 2025-07-31 DIAGNOSIS — R97.20 ELEVATED PROSTATE SPECIFIC ANTIGEN (PSA): ICD-10-CM

## 2025-07-31 DIAGNOSIS — M10.9 GOUT, UNSPECIFIED CAUSE, UNSPECIFIED CHRONICITY, UNSPECIFIED SITE: ICD-10-CM

## 2025-07-31 DIAGNOSIS — E78.00 HYPERCHOLESTEREMIA: ICD-10-CM

## 2025-07-31 LAB
ALBUMIN SERPL BCG-MCNC: 4 G/DL (ref 3.5–5)
ALP SERPL-CCNC: 51 U/L (ref 34–104)
ALT SERPL W P-5'-P-CCNC: 21 U/L (ref 7–52)
ANION GAP SERPL CALCULATED.3IONS-SCNC: 8 MMOL/L (ref 4–13)
AST SERPL W P-5'-P-CCNC: 18 U/L (ref 13–39)
BASOPHILS # BLD AUTO: 0.05 THOUSANDS/ÂΜL (ref 0–0.1)
BASOPHILS NFR BLD AUTO: 1 % (ref 0–1)
BILIRUB SERPL-MCNC: 0.93 MG/DL (ref 0.2–1)
BUN SERPL-MCNC: 13 MG/DL (ref 5–25)
CALCIUM SERPL-MCNC: 9.6 MG/DL (ref 8.4–10.2)
CHLORIDE SERPL-SCNC: 102 MMOL/L (ref 96–108)
CHOLEST SERPL-MCNC: 254 MG/DL (ref ?–200)
CO2 SERPL-SCNC: 29 MMOL/L (ref 21–32)
CREAT SERPL-MCNC: 0.88 MG/DL (ref 0.6–1.3)
EOSINOPHIL # BLD AUTO: 0.2 THOUSAND/ÂΜL (ref 0–0.61)
EOSINOPHIL NFR BLD AUTO: 3 % (ref 0–6)
ERYTHROCYTE [DISTWIDTH] IN BLOOD BY AUTOMATED COUNT: 13.4 % (ref 11.6–15.1)
GFR SERPL CREATININE-BSD FRML MDRD: 85 ML/MIN/1.73SQ M
GLUCOSE P FAST SERPL-MCNC: 88 MG/DL (ref 65–99)
HCT VFR BLD AUTO: 46.8 % (ref 36.5–49.3)
HDLC SERPL-MCNC: 75 MG/DL
HGB BLD-MCNC: 15.7 G/DL (ref 12–17)
IMM GRANULOCYTES # BLD AUTO: 0.05 THOUSAND/UL (ref 0–0.2)
IMM GRANULOCYTES NFR BLD AUTO: 1 % (ref 0–2)
LDLC SERPL CALC-MCNC: 156 MG/DL (ref 0–100)
LYMPHOCYTES # BLD AUTO: 1.91 THOUSANDS/ÂΜL (ref 0.6–4.47)
LYMPHOCYTES NFR BLD AUTO: 26 % (ref 14–44)
MCH RBC QN AUTO: 32 PG (ref 26.8–34.3)
MCHC RBC AUTO-ENTMCNC: 33.5 G/DL (ref 31.4–37.4)
MCV RBC AUTO: 95 FL (ref 82–98)
MONOCYTES # BLD AUTO: 0.74 THOUSAND/ÂΜL (ref 0.17–1.22)
MONOCYTES NFR BLD AUTO: 10 % (ref 4–12)
NEUTROPHILS # BLD AUTO: 4.38 THOUSANDS/ÂΜL (ref 1.85–7.62)
NEUTS SEG NFR BLD AUTO: 59 % (ref 43–75)
NRBC BLD AUTO-RTO: 0 /100 WBCS
PLATELET # BLD AUTO: 282 THOUSANDS/UL (ref 149–390)
PMV BLD AUTO: 9.9 FL (ref 8.9–12.7)
POTASSIUM SERPL-SCNC: 3.9 MMOL/L (ref 3.5–5.3)
PROT SERPL-MCNC: 7.1 G/DL (ref 6.4–8.4)
PSA SERPL-MCNC: 1.22 NG/ML (ref 0–4)
RBC # BLD AUTO: 4.91 MILLION/UL (ref 3.88–5.62)
SODIUM SERPL-SCNC: 139 MMOL/L (ref 135–147)
TRIGL SERPL-MCNC: 114 MG/DL (ref ?–150)
TSH SERPL DL<=0.05 MIU/L-ACNC: 1.37 UIU/ML (ref 0.45–4.5)
URATE SERPL-MCNC: 6.4 MG/DL (ref 3.5–8.5)
WBC # BLD AUTO: 7.33 THOUSAND/UL (ref 4.31–10.16)

## 2025-07-31 PROCEDURE — 84550 ASSAY OF BLOOD/URIC ACID: CPT

## 2025-07-31 PROCEDURE — 80053 COMPREHEN METABOLIC PANEL: CPT

## 2025-07-31 PROCEDURE — 84443 ASSAY THYROID STIM HORMONE: CPT

## 2025-07-31 PROCEDURE — 83036 HEMOGLOBIN GLYCOSYLATED A1C: CPT

## 2025-07-31 PROCEDURE — 36415 COLL VENOUS BLD VENIPUNCTURE: CPT

## 2025-07-31 PROCEDURE — G0103 PSA SCREENING: HCPCS

## 2025-07-31 PROCEDURE — 85025 COMPLETE CBC W/AUTO DIFF WBC: CPT

## 2025-07-31 PROCEDURE — 80061 LIPID PANEL: CPT

## 2025-08-01 LAB
EST. AVERAGE GLUCOSE BLD GHB EST-MCNC: 111 MG/DL
HBA1C MFR BLD: 5.5 %

## (undated) DEVICE — ENDOPATH XCEL BLUNT TIP TROCARS WITH SMOOTH SLEEVES: Brand: ENDOPATH XCEL

## (undated) DEVICE — SYRINGE CATH TIP 50ML

## (undated) DEVICE — INTENDED FOR TISSUE SEPARATION, AND OTHER PROCEDURES THAT REQUIRE A SHARP SURGICAL BLADE TO PUNCTURE OR CUT.: Brand: BARD-PARKER SAFETY BLADES SIZE 15, STERILE

## (undated) DEVICE — SURGICAL CLIPPER BLADE GENERAL USE

## (undated) DEVICE — SUT MONOCRYL 4-0 PS-2 27 IN Y426H

## (undated) DEVICE — MEDI-VAC YANKAUER SUCTION HANDLE W/BULBOUS AND CONTROL VENT: Brand: CARDINAL HEALTH

## (undated) DEVICE — ALLENTOWN LAP CHOLE APP PACK: Brand: CARDINAL HEALTH

## (undated) DEVICE — PAD GROUNDING ADULT

## (undated) DEVICE — 2000CC GUARDIAN II: Brand: GUARDIAN

## (undated) DEVICE — GLOVE INDICATOR PI UNDERGLOVE SZ 8 BLUE

## (undated) DEVICE — SCD SEQUENTIAL COMPRESSION COMFORT SLEEVE MEDIUM KNEE LENGTH: Brand: KENDALL SCD

## (undated) DEVICE — HARMONIC DISPOSABLE HAND SWITCHING ADAPTORS: Brand: HARMONIC

## (undated) DEVICE — BLUE HEAT SCOPE WARMER

## (undated) DEVICE — ABSORBABLE WOUND CLOSURE DEVICE: Brand: V-LOC 90

## (undated) DEVICE — ENDOPATH 5MM CURVED SCISSORS WITH MONOPOLAR CAUTERY: Brand: ENDOPATH

## (undated) DEVICE — TUBING SUCTION 5MM X 12 FT

## (undated) DEVICE — ENDOPATH XCEL BLADELESS TROCARS WITH STABILITY SLEEVES: Brand: ENDOPATH XCEL

## (undated) DEVICE — SUT PROLENE 2-0 CT-2 30 IN 8411H

## (undated) DEVICE — ELECTRODE BLADE MOD E-Z CLEAN 2.5IN 6.4CM -0012M

## (undated) DEVICE — ENDOPATH 5MM ENDOSCOPIC BLUNT TIP DISSECTORS (12 POUCHES CONTAINING 3 DISSECTORS EACH): Brand: ENDOPATH

## (undated) DEVICE — ENDOPATH XCEL UNIVERSAL TROCAR STABLILITY SLEEVES: Brand: ENDOPATH XCEL

## (undated) DEVICE — SPONGE 4 X 4 XRAY 16 PLY STRL LF RFD

## (undated) DEVICE — TRAY FOLEY 16FR URIMETER SURESTEP

## (undated) DEVICE — ADHESIVE SKN CLSR HISTOACRYL FLEX 0.5ML LF

## (undated) DEVICE — IRRIG ENDO FLO TUBING

## (undated) DEVICE — INTENDED FOR TISSUE SEPARATION, AND OTHER PROCEDURES THAT REQUIRE A SHARP SURGICAL BLADE TO PUNCTURE OR CUT.: Brand: BARD-PARKER SAFETY BLADES SIZE 11, STERILE

## (undated) DEVICE — GLOVE INDICATOR PI UNDERGLOVE SZ 6.5 BLUE

## (undated) DEVICE — CHLORAPREP HI-LITE 26ML ORANGE

## (undated) DEVICE — DRAPE EQUIPMENT RF WAND

## (undated) DEVICE — NEEDLE 25G X 1 1/2

## (undated) DEVICE — GLOVE SRG BIOGEL ECLIPSE 8

## (undated) DEVICE — GLOVE SRG BIOGEL 6.5

## (undated) DEVICE — SUT VICRYL 3-0 SH 27 IN J416H

## (undated) DEVICE — BETHLEHEM UNIVERSAL MINOR GEN: Brand: CARDINAL HEALTH

## (undated) DEVICE — SUT VICRYL 0 UR-6 27 IN J603H

## (undated) DEVICE — VIAL DECANTER

## (undated) DEVICE — HARMONIC HOOK 5 MM HDH05D